# Patient Record
Sex: FEMALE | Race: WHITE | NOT HISPANIC OR LATINO | Employment: FULL TIME | URBAN - METROPOLITAN AREA
[De-identification: names, ages, dates, MRNs, and addresses within clinical notes are randomized per-mention and may not be internally consistent; named-entity substitution may affect disease eponyms.]

---

## 2021-10-09 ENCOUNTER — NURSE TRIAGE (OUTPATIENT)
Dept: ADMINISTRATIVE | Facility: CLINIC | Age: 21
End: 2021-10-09

## 2021-10-09 ENCOUNTER — HOSPITAL ENCOUNTER (EMERGENCY)
Facility: HOSPITAL | Age: 21
Discharge: HOME | End: 2021-10-09
Attending: FAMILY MEDICINE

## 2021-10-09 VITALS
OXYGEN SATURATION: 97 % | SYSTOLIC BLOOD PRESSURE: 108 MMHG | TEMPERATURE: 99 F | HEART RATE: 70 BPM | HEIGHT: 70 IN | DIASTOLIC BLOOD PRESSURE: 75 MMHG | WEIGHT: 230 LBS | RESPIRATION RATE: 19 BRPM | BODY MASS INDEX: 32.93 KG/M2

## 2021-10-09 DIAGNOSIS — K04.7 DENTAL INFECTION: ICD-10-CM

## 2021-10-09 DIAGNOSIS — K08.89 PAIN, DENTAL: Primary | ICD-10-CM

## 2021-10-09 PROCEDURE — 99284 EMERGENCY DEPT VISIT MOD MDM: CPT

## 2021-10-09 RX ORDER — HYDROCODONE BITARTRATE AND ACETAMINOPHEN 7.5; 325 MG/1; MG/1
1 TABLET ORAL EVERY 8 HOURS PRN
Qty: 6 TABLET | Refills: 0 | OUTPATIENT
Start: 2021-10-09 | End: 2023-01-17

## 2021-10-09 RX ORDER — AMOXICILLIN 875 MG/1
875 TABLET, FILM COATED ORAL 2 TIMES DAILY
Qty: 14 TABLET | Refills: 0 | OUTPATIENT
Start: 2021-10-09 | End: 2023-01-17

## 2023-01-17 ENCOUNTER — HOSPITAL ENCOUNTER (EMERGENCY)
Facility: HOSPITAL | Age: 23
Discharge: HOME | End: 2023-01-17
Attending: EMERGENCY MEDICINE

## 2023-01-17 VITALS
RESPIRATION RATE: 20 BRPM | WEIGHT: 245 LBS | HEART RATE: 88 BPM | BODY MASS INDEX: 35.07 KG/M2 | HEIGHT: 70 IN | OXYGEN SATURATION: 98 % | TEMPERATURE: 99 F | SYSTOLIC BLOOD PRESSURE: 125 MMHG | DIASTOLIC BLOOD PRESSURE: 85 MMHG

## 2023-01-17 DIAGNOSIS — R11.2 NAUSEA AND VOMITING, UNSPECIFIED VOMITING TYPE: Primary | ICD-10-CM

## 2023-01-17 LAB
HCV AB SERPL QL IA: NORMAL
HIV 1+2 AB+HIV1 P24 AG SERPL QL IA: NORMAL

## 2023-01-17 PROCEDURE — 86803 HEPATITIS C AB TEST: CPT | Performed by: EMERGENCY MEDICINE

## 2023-01-17 PROCEDURE — 87389 HIV-1 AG W/HIV-1&-2 AB AG IA: CPT | Performed by: EMERGENCY MEDICINE

## 2023-01-17 PROCEDURE — 99283 EMERGENCY DEPT VISIT LOW MDM: CPT

## 2023-01-17 PROCEDURE — 36415 COLL VENOUS BLD VENIPUNCTURE: CPT | Performed by: EMERGENCY MEDICINE

## 2023-01-17 NOTE — ED PROVIDER NOTES
Encounter Date: 1/17/2023       History     Chief Complaint   Patient presents with    General Illness     Abdominal cramping with n/v x 4 days. Reports symptoms have resolved and needs note for work.     22 year old patient presented to ER for work clearance letter after she got sick from food poisoning over the weekend, she declined nausea, vomiting, diarrhea and abdominal pain    The history is provided by the patient.   Review of patient's allergies indicates:  No Known Allergies  No past medical history on file.  No past surgical history on file.  No family history on file.  Social History     Tobacco Use    Smoking status: Never    Smokeless tobacco: Never   Substance Use Topics    Alcohol use: Yes     Comment: occ    Drug use: Never     Review of Systems   All other systems reviewed and are negative.    Physical Exam     Initial Vitals   BP Pulse Resp Temp SpO2   01/17/23 1052 01/17/23 1048 01/17/23 1048 01/17/23 1048 01/17/23 1048   125/85 88 20 98.7 °F (37.1 °C) 98 %      MAP       --                Physical Exam    Nursing note and vitals reviewed.  Constitutional: She appears well-developed and well-nourished. She is Obese . No distress.   HENT:   Head: Normocephalic and atraumatic.   Eyes: EOM are normal. Pupils are equal, round, and reactive to light.   Neck:   Normal range of motion.  Cardiovascular:  Normal rate and regular rhythm.           No murmur heard.  Pulmonary/Chest: Breath sounds normal. No respiratory distress. She has no wheezes. She has no rhonchi. She has no rales. She exhibits no tenderness.   Abdominal: Abdomen is soft.   Musculoskeletal:         General: Normal range of motion.      Cervical back: Normal range of motion.     Neurological: She is alert and oriented to person, place, and time. She has normal strength. GCS score is 15. GCS eye subscore is 4. GCS verbal subscore is 5. GCS motor subscore is 6.   Skin: Skin is warm and dry.   Psychiatric: She has a normal mood and affect.        ED Course   Procedures  Labs Reviewed   HIV 1 / 2 ANTIBODY   HEPATITIS C ANTIBODY          Imaging Results    None          Medications - No data to display  Medical Decision Making:   ED Management:  Benign physical exam  Ok to return to work .                        Clinical Impression:   Final diagnoses:  [R11.2] Nausea and vomiting, unspecified vomiting type (Primary)        ED Disposition Condition    Discharge Stable          ED Prescriptions    None       Follow-up Information       Follow up With Specialties Details Why Contact Info    [place] - Emergency Dept Emergency Medicine  If symptoms worsen [address] [facility] [address] [phone]             Rani Lopez NP  01/17/23 2945

## 2023-01-17 NOTE — Clinical Note
"Leticia Wangdusty Gurrola was seen and treated in our emergency department on 1/17/2023.  She may return to work on 01/18/2023.       If you have any questions or concerns, please don't hesitate to call.      Rani Lopez NP"

## 2023-02-12 ENCOUNTER — HOSPITAL ENCOUNTER (EMERGENCY)
Facility: HOSPITAL | Age: 23
Discharge: HOME | End: 2023-02-12
Attending: EMERGENCY MEDICINE

## 2023-02-12 VITALS
RESPIRATION RATE: 18 BRPM | DIASTOLIC BLOOD PRESSURE: 86 MMHG | SYSTOLIC BLOOD PRESSURE: 132 MMHG | OXYGEN SATURATION: 99 % | WEIGHT: 245 LBS | BODY MASS INDEX: 35.07 KG/M2 | TEMPERATURE: 98 F | HEART RATE: 98 BPM | HEIGHT: 70 IN

## 2023-02-12 DIAGNOSIS — K02.9 DENTAL CARIES: ICD-10-CM

## 2023-02-12 DIAGNOSIS — K08.89 TOOTHACHE: Primary | ICD-10-CM

## 2023-02-12 LAB
B-HCG UR QL: NEGATIVE
CTP QC/QA: YES

## 2023-02-12 PROCEDURE — 81025 URINE PREGNANCY TEST: CPT | Performed by: EMERGENCY MEDICINE

## 2023-02-12 PROCEDURE — 99284 EMERGENCY DEPT VISIT MOD MDM: CPT

## 2023-02-12 PROCEDURE — 63600175 PHARM REV CODE 636 W HCPCS: Performed by: EMERGENCY MEDICINE

## 2023-02-12 PROCEDURE — 96372 THER/PROPH/DIAG INJ SC/IM: CPT | Performed by: EMERGENCY MEDICINE

## 2023-02-12 RX ORDER — HYDROMORPHONE HYDROCHLORIDE 1 MG/ML
2 INJECTION, SOLUTION INTRAMUSCULAR; INTRAVENOUS; SUBCUTANEOUS
Status: COMPLETED | OUTPATIENT
Start: 2023-02-12 | End: 2023-02-12

## 2023-02-12 RX ORDER — NAPROXEN 500 MG/1
500 TABLET ORAL 2 TIMES DAILY WITH MEALS
Qty: 30 TABLET | Refills: 0 | Status: ON HOLD | OUTPATIENT
Start: 2023-02-12 | End: 2023-12-21 | Stop reason: HOSPADM

## 2023-02-12 RX ORDER — PENICILLIN V POTASSIUM 500 MG/1
500 TABLET, FILM COATED ORAL 4 TIMES DAILY
Qty: 40 TABLET | Refills: 0 | Status: SHIPPED | OUTPATIENT
Start: 2023-02-12 | End: 2023-02-19

## 2023-02-12 RX ORDER — OXYCODONE AND ACETAMINOPHEN 5; 325 MG/1; MG/1
1 TABLET ORAL EVERY 6 HOURS PRN
Qty: 8 TABLET | Refills: 0 | Status: ON HOLD | OUTPATIENT
Start: 2023-02-12 | End: 2023-12-18 | Stop reason: HOSPADM

## 2023-02-12 RX ADMIN — HYDROMORPHONE HYDROCHLORIDE 2 MG: 1 INJECTION, SOLUTION INTRAMUSCULAR; INTRAVENOUS; SUBCUTANEOUS at 04:02

## 2023-02-12 NOTE — DISCHARGE INSTRUCTIONS
Follow-up with dentist Monday morning as discussed.  Return to emergency department for worsening symptoms or any problems

## 2023-02-12 NOTE — ED PROVIDER NOTES
Encounter Date: 2/12/2023       History     Chief Complaint   Patient presents with    Dental Pain     23-year-old female presented emergency department with severe toothache ongoing for the past 2 days in the area of left lower wisdom tooth.  Patient denies any other complaints.  Denies fever or chills or nausea vomiting however has severe pain.    Review of patient's allergies indicates:  No Known Allergies  No past medical history on file.  No past surgical history on file.  No family history on file.  Social History     Tobacco Use    Smoking status: Never    Smokeless tobacco: Never   Substance Use Topics    Alcohol use: Yes     Comment: occ    Drug use: Never     Review of Systems   Constitutional: Negative.    HENT:  Positive for dental problem.    Eyes: Negative.    Respiratory: Negative.     Cardiovascular: Negative.  Negative for chest pain.   Gastrointestinal: Negative.    Endocrine: Negative.    Genitourinary: Negative.    Musculoskeletal: Negative.    Skin: Negative.    Allergic/Immunologic: Negative.    Neurological: Negative.    Hematological: Negative.    Psychiatric/Behavioral: Negative.     All other systems reviewed and are negative.    Physical Exam     Initial Vitals [02/12/23 0414]   BP Pulse Resp Temp SpO2   (!) 143/92 98 18 98.2 °F (36.8 °C) 97 %      MAP       --         Physical Exam    Nursing note and vitals reviewed.  Constitutional: She appears well-developed and well-nourished.   Uncomfortable   HENT:   Head: Normocephalic and atraumatic.   Nose: Nose normal.   Mouth/Throat: Oropharynx is clear and moist.   Bilateral lower molar teeth with dental caries.  No obvious gum swelling and no evidence of abscess and pain is secondary to dental caries and infection of the wisdom teeth more on the left lower than the right   Eyes: Conjunctivae and EOM are normal. Pupils are equal, round, and reactive to light.   Neck: Neck supple. No thyromegaly present. No tracheal deviation present. No JVD  present.   Normal range of motion.  Cardiovascular:  Normal rate, regular rhythm, normal heart sounds and intact distal pulses.           No murmur heard.  Pulmonary/Chest: Breath sounds normal. No stridor. No respiratory distress. She has no wheezes. She has no rales.   Abdominal: Abdomen is soft. Bowel sounds are normal.   Musculoskeletal:         General: No edema. Normal range of motion.      Cervical back: Normal range of motion and neck supple.     Neurological: She is alert and oriented to person, place, and time. GCS score is 15. GCS eye subscore is 4. GCS verbal subscore is 5. GCS motor subscore is 6.   Skin: Skin is warm. Capillary refill takes less than 2 seconds.   Psychiatric: She has a normal mood and affect. Thought content normal.       ED Course   Procedures  Labs Reviewed   POCT URINE PREGNANCY          Imaging Results    None          Medications   HYDROmorphone injection 2 mg (2 mg Intramuscular Given 2/12/23 2480)     Medical Decision Making:   Differential Diagnosis:   23-year-old with wisdom tooth dental caries.  Pain control.  Patient otherwise nontoxic.  Discharged with return precautions and instructions and follow-up                        Clinical Impression:   Final diagnoses:  [K08.89] Toothache (Primary)  [K02.9] Dental caries        ED Disposition Condition    Discharge Stable          ED Prescriptions       Medication Sig Dispense Start Date End Date Auth. Provider    penicillin v potassium (VEETID) 500 MG tablet Take 1 tablet (500 mg total) by mouth 4 (four) times daily. for 7 days 40 tablet 2/12/2023 2/19/2023 Giovanni Riojas MD    oxyCODONE-acetaminophen (PERCOCET) 5-325 mg per tablet Take 1 tablet by mouth every 6 (six) hours as needed for Pain. 8 tablet 2/12/2023 -- Giovanni Riojas MD    naproxen (NAPROSYN) 500 MG tablet Take 1 tablet (500 mg total) by mouth 2 (two) times daily with meals. 30 tablet 2/12/2023 -- Giovanni Riojas MD          Follow-up Information       Follow up With  Specialties Details Why Contact Info    [facility]  In 2 days  [address] [address]  [phone]               Giovanni Riojas MD  02/12/23 6748

## 2023-05-22 ENCOUNTER — HOSPITAL ENCOUNTER (EMERGENCY)
Facility: HOSPITAL | Age: 23
Discharge: HOME | End: 2023-05-22
Attending: EMERGENCY MEDICINE
Payer: OTHER MISCELLANEOUS

## 2023-05-22 VITALS
TEMPERATURE: 99 F | RESPIRATION RATE: 18 BRPM | WEIGHT: 250 LBS | SYSTOLIC BLOOD PRESSURE: 130 MMHG | OXYGEN SATURATION: 95 % | HEART RATE: 81 BPM | DIASTOLIC BLOOD PRESSURE: 94 MMHG | BODY MASS INDEX: 35.79 KG/M2 | HEIGHT: 70 IN

## 2023-05-22 DIAGNOSIS — S39.012A BACK STRAIN, INITIAL ENCOUNTER: Primary | ICD-10-CM

## 2023-05-22 LAB — B-HCG UR QL: NEGATIVE

## 2023-05-22 PROCEDURE — 99284 EMERGENCY DEPT VISIT MOD MDM: CPT

## 2023-05-22 PROCEDURE — 96372 THER/PROPH/DIAG INJ SC/IM: CPT | Performed by: PHYSICIAN ASSISTANT

## 2023-05-22 PROCEDURE — 63600175 PHARM REV CODE 636 W HCPCS: Performed by: PHYSICIAN ASSISTANT

## 2023-05-22 PROCEDURE — 81025 URINE PREGNANCY TEST: CPT | Performed by: PHYSICIAN ASSISTANT

## 2023-05-22 RX ORDER — ORPHENADRINE CITRATE 30 MG/ML
60 INJECTION INTRAMUSCULAR; INTRAVENOUS
Status: COMPLETED | OUTPATIENT
Start: 2023-05-22 | End: 2023-05-22

## 2023-05-22 RX ORDER — NAPROXEN 500 MG/1
500 TABLET ORAL 2 TIMES DAILY WITH MEALS
Qty: 14 TABLET | Refills: 0 | Status: SHIPPED | OUTPATIENT
Start: 2023-05-22 | End: 2023-05-29

## 2023-05-22 RX ORDER — KETOROLAC TROMETHAMINE 30 MG/ML
30 INJECTION, SOLUTION INTRAMUSCULAR; INTRAVENOUS
Status: COMPLETED | OUTPATIENT
Start: 2023-05-22 | End: 2023-05-22

## 2023-05-22 RX ORDER — CYCLOBENZAPRINE HCL 10 MG
10 TABLET ORAL 3 TIMES DAILY PRN
Qty: 21 TABLET | Refills: 0 | Status: SHIPPED | OUTPATIENT
Start: 2023-05-22 | End: 2023-05-29

## 2023-05-22 RX ADMIN — ORPHENADRINE CITRATE 60 MG: 30 INJECTION INTRAMUSCULAR; INTRAVENOUS at 08:05

## 2023-05-22 RX ADMIN — KETOROLAC TROMETHAMINE 30 MG: 30 INJECTION, SOLUTION INTRAMUSCULAR; INTRAVENOUS at 08:05

## 2023-05-22 NOTE — FIRST PROVIDER EVALUATION
Emergency Department TeleTriage Encounter Note      CHIEF COMPLAINT    Chief Complaint   Patient presents with    Back Pain     Lifting injury today        VITAL SIGNS   Initial Vitals [05/22/23 1759]   BP Pulse Resp Temp SpO2   (!) 137/93 103 18 98 °F (36.7 °C) 96 %      MAP       --            ALLERGIES    Review of patient's allergies indicates:  No Known Allergies    PROVIDER TRIAGE NOTE  This is a teletriage evaluation of a 23 y.o. female presenting to the ED complaining of back pain. 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 She reports low back pain. She denies numbness, tingling, or weakness in her legs. She denies incontinence. She took 12 ibuprofen today without relief.    Patient is alert and oriented. She speaks in complete sentences. She is sitting upright in the chair in no distress.     Initial orders will be placed and care will be transferred to an alternate provider when patient is roomed for a full evaluation. Any additional orders and the final disposition will be determined by that provider.         ORDERS  Labs Reviewed   PREGNANCY TEST, URINE RAPID       ED Orders (720h ago, onward)      Start Ordered     Status Ordering Provider    05/22/23 1804 05/22/23 1803  Pregnancy, urine rapid  STAT         Ordered KAYLA LYNNE              Virtual Visit Note: The provider triage portion of this emergency department evaluation and documentation was performed via [employer], a HIPAA-compliant telemedicine application, in concert with a tele-presenter in the room. A face to face patient evaluation with one of my colleagues will occur once the patient is placed in an emergency department room.      DISCLAIMER: This note was prepared with [employer] voice recognition transcription software. Garbled syntax, mangled pronouns, and other bizarre constructions may be attributed to that software system.

## 2023-05-22 NOTE — Clinical Note
"Leticia Wangen" Izabela was seen and treated in our emergency department on 5/22/2023.  She may return to work on 05/24/2023.       If you have any questions or concerns, please don't hesitate to call.      Jennifer Palomino PA-C"

## 2023-05-23 NOTE — ED PROVIDER NOTES
Encounter Date: 5/22/2023    SCRIBE #1 NOTE: I, Jair Nascimento, am scribing for, and in the presence of,  Jennifer Palomino PA-C.     History     Chief Complaint   Patient presents with    Back Pain     Lifting injury today      Time seen by provider: 7:01 PM on 05/22/2023    Leticia Gurrola is a 23 y.o. female who presents to the ED with an onset of persistent right-sided thoracic back pain that began this morning after a lifting injury. 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 She state her back pain worsens with movement. The patient denies numbness or tingling, weakness in her extremities or groin, urinary or bowel incontinence, or any other symptoms at this time. She has taken ibuprofen for the pain without relief. She denies a history of back issues. There is no recorded PMHx or PSHx.    The history is provided by the patient.   Review of patient's allergies indicates:  No Known Allergies  No past medical history on file.  No past surgical history on file.  No family history on file.  Social History     Tobacco Use    Smoking status: Never    Smokeless tobacco: Never   Substance Use Topics    Alcohol use: Yes     Comment: occ    Drug use: Never     Review of Systems   Constitutional:  Negative for activity change, appetite change, chills and fever.   HENT:  Negative for congestion, rhinorrhea and sore throat.    Eyes:  Negative for redness and visual disturbance.   Respiratory:  Negative for cough, chest tightness and shortness of breath.    Cardiovascular:  Negative for chest pain.   Gastrointestinal:  Negative for abdominal pain, diarrhea, nausea and vomiting.   Genitourinary:  Negative for dysuria and frequency.   Musculoskeletal:  Positive for back pain. Negative for neck pain and neck stiffness.   Skin:  Negative for rash.   Neurological:  Negative for dizziness, syncope, weakness and  numbness.        Negative for paresthesias or urinary or bowel incontinence.     Physical Exam     Initial Vitals [05/22/23 1759]   BP Pulse Resp Temp SpO2   (!) 137/93 103 18 98 °F (36.7 °C) 96 %      MAP       --         Physical Exam    Nursing note and vitals reviewed.  Constitutional: She appears well-developed and well-nourished. She is not diaphoretic. No distress.   HENT:   Head: Normocephalic and atraumatic.   Neck: Neck supple.   Normal range of motion.  Cardiovascular:  Normal rate, regular rhythm, normal heart sounds and intact distal pulses.           Pulmonary/Chest: Breath sounds normal. No respiratory distress. She has no wheezes. She has no rhonchi. She has no rales. She exhibits no tenderness.   Musculoskeletal:         General: Tenderness present. No edema. Normal range of motion.      Cervical back: Normal range of motion and neck supple. No bony tenderness. No spinous process tenderness.      Thoracic back: Tenderness (right-sided paraspinal thoracic) present. No bony tenderness.      Lumbar back: No bony tenderness.     Neurological: She is alert and oriented to person, place, and time. She has normal strength. No sensory deficit.   5/5 strength and sensation to the BUE's and BLE's.   Skin: Skin is warm and dry. No rash and no abscess noted. No erythema.   Psychiatric: She has a normal mood and affect.       ED Course   Procedures  Labs Reviewed   PREGNANCY TEST, URINE RAPID    Narrative:     Specimen Source->Urine          Imaging Results    None          Medications   ketorolac injection 30 mg (30 mg Intramuscular Given 5/22/23 2006)   orphenadrine injection 60 mg (60 mg Intramuscular Given 5/22/23 2007)     Medical Decision Making:   History:   Old Medical Records: I decided to obtain old medical records.  Clinical Tests:   Lab Tests: Ordered and Reviewed     APC / Resident Notes:   Urgent evaluation of a 23-year-old female who presents with right-sided back pain after lifting today.  She is  neurovascularly intact.  Pain is worse with movement.  She is muscular tenderness to palpation.  No midline spinal tenderness.  Patient was given medications in the ER with improvement in pain.  Recommend close follow up primary care.  Low suspicion for acute fracture. Discussed results with patient. Return precautions given. Based on my clinical evaluation, I do not appreciate any immediate, emergent, or life threatening condition or etiology that warrants additional workup today and feel that the patient can be discharged with close follow up care.  Patient is to follow up with their primary care provider. Case was discussed with Dr. Ku who is in agreement with the plan of care. All questions answered.      Scribe Attestation:   Scribe #1: I performed the above scribed service and the documentation accurately describes the services I performed. I attest to the accuracy of the note.            I, Jennifer Palomino PA-C, personally performed the services described in this documentation. All medical record entries made by the scribe were at my direction and in my presence.  I have reviewed the chart and agree that the record reflects my personal performance and is accurate and complete. Jennifer Palomino PA-C.  8:40 PM 05/22/2023         Clinical Impression:   Final diagnoses:  [S39.012A] Back strain, initial encounter (Primary)        ED Disposition Condition    Discharge Stable          ED Prescriptions       Medication Sig Dispense Start Date End Date Auth. Provider    naproxen (NAPROSYN) 500 MG tablet Take 1 tablet (500 mg total) by mouth 2 (two) times daily with meals. for 7 days 14 tablet 5/22/2023 5/29/2023 Jennifer Palomino PA-C    cyclobenzaprine (FLEXERIL) 10 MG tablet Take 1 tablet (10 mg total) by mouth 3 (three) times daily as needed for Muscle spasms. 21 tablet 5/22/2023 5/29/2023 Jennifer Palomino PA-C          Follow-up Information    None          Jennifer Jhaveri  CHOLO Palomino  05/22/23 2041

## 2023-05-23 NOTE — DISCHARGE INSTRUCTIONS
Take medications as prescribed.  Follow up with your primary care provider  For worsening symptoms, chest pain, shortness of breath, increased abdominal pain, high grade fever, stroke or stroke like symptoms, immediately go to the nearest Emergency Room or call 911 as soon as possible.

## 2023-06-07 ENCOUNTER — HOSPITAL ENCOUNTER (EMERGENCY)
Facility: HOSPITAL | Age: 23
Discharge: HOME | End: 2023-06-08
Attending: EMERGENCY MEDICINE

## 2023-06-07 DIAGNOSIS — K08.89 PAIN, DENTAL: Primary | ICD-10-CM

## 2023-06-07 PROCEDURE — 99281 EMR DPT VST MAYX REQ PHY/QHP: CPT

## 2023-06-08 VITALS
WEIGHT: 250 LBS | DIASTOLIC BLOOD PRESSURE: 92 MMHG | TEMPERATURE: 99 F | HEART RATE: 97 BPM | BODY MASS INDEX: 35.87 KG/M2 | OXYGEN SATURATION: 99 % | SYSTOLIC BLOOD PRESSURE: 138 MMHG | RESPIRATION RATE: 17 BRPM

## 2023-06-08 PROCEDURE — 25000003 PHARM REV CODE 250: Performed by: EMERGENCY MEDICINE

## 2023-06-08 PROCEDURE — 64400 NJX AA&/STRD TRIGEMINAL NRV: CPT | Mod: LT

## 2023-06-08 RX ORDER — BUPIVACAINE HYDROCHLORIDE 5 MG/ML
10 INJECTION, SOLUTION EPIDURAL; INTRACAUDAL
Status: COMPLETED | OUTPATIENT
Start: 2023-06-08 | End: 2023-06-08

## 2023-06-08 RX ADMIN — BUPIVACAINE HYDROCHLORIDE 50 MG: 5 INJECTION, SOLUTION EPIDURAL; INTRACAUDAL; PERINEURAL at 12:06

## 2023-06-08 NOTE — ED NOTES
Pt AAO and ambulatory.  VSS; no signs of distress.  Pt instructed regarding written discharge and follow-up instructions received upon completion of current visit; verbal acknowledgement of understanding.  Pt instructed regarding concerns/reasons for return visit relative to same issues surrounding current visit.

## 2023-06-08 NOTE — ED PROVIDER NOTES
Encounter Date: 6/7/2023       History     Chief Complaint   Patient presents with    Dental Pain     C/o left molar tooth pain since earlier this evening     23-year-old female with no past medical history presenting with left lower dental pain x2 days.  No swelling, fevers, throat pain, difficulty swallowing.  Patient took over-the-counter medications without relief prior to coming to the emergency department.  No other complaints.    Review of patient's allergies indicates:  No Known Allergies  No past medical history on file.  No past surgical history on file.  No family history on file.  Social History     Tobacco Use    Smoking status: Never    Smokeless tobacco: Never   Substance Use Topics    Alcohol use: Yes     Comment: occ    Drug use: Never     Review of Systems   Constitutional:  Negative for appetite change.   HENT:  Negative for facial swelling.    Eyes:  Negative for itching.   Respiratory:  Negative for apnea and stridor.    Gastrointestinal:  Negative for abdominal distention.   Genitourinary:  Negative for enuresis.   Musculoskeletal:  Negative for neck stiffness.   Skin:  Negative for color change.   Neurological:  Negative for tremors.   Hematological:  Does not bruise/bleed easily.   Psychiatric/Behavioral:  Negative for decreased concentration.      Physical Exam     Initial Vitals [06/07/23 2149]   BP Pulse Resp Temp SpO2   (!) 153/100 105 18 98.3 °F (36.8 °C) 98 %      MAP       --         Physical Exam    Constitutional: No distress.   HENT:   Head: Normocephalic.   Nose: Nose normal.   Positive dental fracture to left lower 3rd molar, no surrounding swelling, fluctuance or tenderness   Eyes: Right eye exhibits no discharge. Left eye exhibits no discharge.   Cardiovascular:  Normal rate.           Pulmonary/Chest: No stridor. No respiratory distress.   Abdominal: She exhibits no distension.     Neurological: She is alert.   Skin: Skin is warm and dry.   Psychiatric: She has a normal mood  "and affect.       ED Course   Nerve Block    Date/Time: 6/8/2023 4:35 AM  Location procedure was performed: [facility] EMERGENCY DEPARTMENT  Performed by: Santos Banks MD  Authorized by: Santos Banks MD   Pre-operative diagnosis: dental pain  Post-operative diagnosis: dentalpain  Consent Done: Yes  Consent: Verbal consent obtained.  Risks and benefits: risks, benefits and alternatives were discussed  Consent given by: patient  Patient understanding: patient states understanding of the procedure being performed  Patient identity confirmed: name and verbally with patient  Time out: Immediately prior to procedure a "time out" was called to verify the correct patient, procedure, equipment, support staff and site/side marked as required.  Indications: pain relief  Body area: face/mouth  Nerve: inferior alveolar  Laterality: left    Patient sedated: no  Location technique: anatomical landmarks  Local Anesthetic: bupivacaine 0.5% without epinephrine  Anesthetic total: 10 mL  Complications: No  Outcome: pain improved  Patient tolerance: Patient tolerated the procedure well with no immediate complications      Labs Reviewed - No data to display       Imaging Results    None          Medications   BUPivacaine (PF) 0.5% (5 mg/mL) injection 50 mg (50 mg Subcutaneous Given by Provider 6/8/23 0042)     Medical Decision Making:   Initial Assessment:   A/P:  No evidence of dental abscess, no indication for antibiotics or drainage at this time.  Patient with significant relief status post inferior alveolar dental block, will give instructions to follow-up within 24 hours with dental for further management.  Patient given strict immediate return precautions for worsening symptoms, discharged home.  No indication for admission or imaging.                        Clinical Impression:   Final diagnoses:  [K08.89] Pain, dental (Primary)        ED Disposition Condition    Discharge Stable          ED Prescriptions    None       Follow-up " Information       Follow up With Specialties Details Why Contact Info    [facility]  Schedule an appointment as soon as possible for a visit                Santos Banks MD  06/08/23 8358

## 2023-12-16 ENCOUNTER — HOSPITAL ENCOUNTER (INPATIENT)
Facility: HOSPITAL | Age: 23
LOS: 2 days | Discharge: PSYCHIATRIC OR SUBSTANCE USE FACILITY | DRG: 918 | End: 2023-12-18
Attending: EMERGENCY MEDICINE | Admitting: HOSPITALIST

## 2023-12-16 DIAGNOSIS — I45.9 HEART BLOCK: ICD-10-CM

## 2023-12-16 DIAGNOSIS — T39.1X2A INTENTIONAL ACETAMINOPHEN OVERDOSE, INITIAL ENCOUNTER: Primary | ICD-10-CM

## 2023-12-16 DIAGNOSIS — Z00.8 MEDICAL CLEARANCE FOR PSYCHIATRIC ADMISSION: ICD-10-CM

## 2023-12-16 LAB
ALBUMIN SERPL BCP-MCNC: 4.3 G/DL (ref 3.5–5.2)
ALP SERPL-CCNC: 61 U/L (ref 55–135)
ALT SERPL W/O P-5'-P-CCNC: 24 U/L (ref 10–44)
ANION GAP SERPL CALC-SCNC: 17 MMOL/L (ref 8–16)
APAP SERPL-MCNC: 156.9 UG/ML (ref 10–20)
APAP SERPL-MCNC: 93.3 UG/ML (ref 10–20)
AST SERPL-CCNC: 17 U/L (ref 10–40)
B-HCG UR QL: NEGATIVE
BASOPHILS # BLD AUTO: 0.06 K/UL (ref 0–0.2)
BASOPHILS NFR BLD: 0.7 % (ref 0–1.9)
BILIRUB SERPL-MCNC: 0.3 MG/DL (ref 0.1–1)
BUN SERPL-MCNC: 9 MG/DL (ref 6–20)
CALCIUM SERPL-MCNC: 9.3 MG/DL (ref 8.7–10.5)
CHLORIDE SERPL-SCNC: 105 MMOL/L (ref 95–110)
CO2 SERPL-SCNC: 16 MMOL/L (ref 23–29)
CREAT SERPL-MCNC: 1 MG/DL (ref 0.5–1.4)
CTP QC/QA: YES
DIFFERENTIAL METHOD: NORMAL
EOSINOPHIL # BLD AUTO: 0.1 K/UL (ref 0–0.5)
EOSINOPHIL NFR BLD: 0.8 % (ref 0–8)
ERYTHROCYTE [DISTWIDTH] IN BLOOD BY AUTOMATED COUNT: 12.3 % (ref 11.5–14.5)
EST. GFR  (NO RACE VARIABLE): >60 ML/MIN/1.73 M^2
ETHANOL SERPL-MCNC: <10 MG/DL
GLUCOSE SERPL-MCNC: 112 MG/DL (ref 70–110)
HCT VFR BLD AUTO: 42.9 % (ref 37–48.5)
HGB BLD-MCNC: 14.6 G/DL (ref 12–16)
IMM GRANULOCYTES # BLD AUTO: 0.02 K/UL (ref 0–0.04)
IMM GRANULOCYTES NFR BLD AUTO: 0.2 % (ref 0–0.5)
LYMPHOCYTES # BLD AUTO: 3 K/UL (ref 1–4.8)
LYMPHOCYTES NFR BLD: 35 % (ref 18–48)
MCH RBC QN AUTO: 29.4 PG (ref 27–31)
MCHC RBC AUTO-ENTMCNC: 34 G/DL (ref 32–36)
MCV RBC AUTO: 86 FL (ref 82–98)
MONOCYTES # BLD AUTO: 0.6 K/UL (ref 0.3–1)
MONOCYTES NFR BLD: 7.6 % (ref 4–15)
NEUTROPHILS # BLD AUTO: 4.7 K/UL (ref 1.8–7.7)
NEUTROPHILS NFR BLD: 55.7 % (ref 38–73)
NRBC BLD-RTO: 0 /100 WBC
PLATELET # BLD AUTO: 301 K/UL (ref 150–450)
PMV BLD AUTO: 9.5 FL (ref 9.2–12.9)
POTASSIUM SERPL-SCNC: 3.8 MMOL/L (ref 3.5–5.1)
PROT SERPL-MCNC: 7.5 G/DL (ref 6–8.4)
RBC # BLD AUTO: 4.97 M/UL (ref 4–5.4)
SALICYLATES SERPL-MCNC: <1.5 MG/DL (ref 15–30)
SODIUM SERPL-SCNC: 138 MMOL/L (ref 136–145)
TSH SERPL DL<=0.005 MIU/L-ACNC: 2.91 UIU/ML (ref 0.34–5.6)
WBC # BLD AUTO: 8.45 K/UL (ref 3.9–12.7)

## 2023-12-16 PROCEDURE — 63600175 PHARM REV CODE 636 W HCPCS

## 2023-12-16 PROCEDURE — 81003 URINALYSIS AUTO W/O SCOPE: CPT | Performed by: EMERGENCY MEDICINE

## 2023-12-16 PROCEDURE — 82077 ASSAY SPEC XCP UR&BREATH IA: CPT | Performed by: EMERGENCY MEDICINE

## 2023-12-16 PROCEDURE — 81025 URINE PREGNANCY TEST: CPT | Performed by: EMERGENCY MEDICINE

## 2023-12-16 PROCEDURE — 85025 COMPLETE CBC W/AUTO DIFF WBC: CPT | Performed by: EMERGENCY MEDICINE

## 2023-12-16 PROCEDURE — 96372 THER/PROPH/DIAG INJ SC/IM: CPT

## 2023-12-16 PROCEDURE — 80053 COMPREHEN METABOLIC PANEL: CPT | Performed by: EMERGENCY MEDICINE

## 2023-12-16 PROCEDURE — 93010 EKG 12-LEAD: ICD-10-PCS | Mod: ,,, | Performed by: GENERAL PRACTICE

## 2023-12-16 PROCEDURE — 80307 DRUG TEST PRSMV CHEM ANLYZR: CPT | Performed by: EMERGENCY MEDICINE

## 2023-12-16 PROCEDURE — 84443 ASSAY THYROID STIM HORMONE: CPT | Performed by: EMERGENCY MEDICINE

## 2023-12-16 PROCEDURE — 80143 DRUG ASSAY ACETAMINOPHEN: CPT | Performed by: EMERGENCY MEDICINE

## 2023-12-16 PROCEDURE — 93010 ELECTROCARDIOGRAM REPORT: CPT | Mod: ,,, | Performed by: GENERAL PRACTICE

## 2023-12-16 PROCEDURE — 12000002 HC ACUTE/MED SURGE SEMI-PRIVATE ROOM

## 2023-12-16 PROCEDURE — 99291 CRITICAL CARE FIRST HOUR: CPT

## 2023-12-16 PROCEDURE — 80179 DRUG ASSAY SALICYLATE: CPT | Performed by: EMERGENCY MEDICINE

## 2023-12-16 PROCEDURE — 93005 ELECTROCARDIOGRAM TRACING: CPT | Performed by: GENERAL PRACTICE

## 2023-12-16 RX ORDER — ONDANSETRON 2 MG/ML
4 INJECTION INTRAMUSCULAR; INTRAVENOUS
Status: COMPLETED | OUTPATIENT
Start: 2023-12-17 | End: 2023-12-16

## 2023-12-16 RX ORDER — DIPHENHYDRAMINE HYDROCHLORIDE 50 MG/ML
INJECTION INTRAMUSCULAR; INTRAVENOUS
Status: COMPLETED
Start: 2023-12-16 | End: 2023-12-16

## 2023-12-16 RX ORDER — LORAZEPAM 2 MG/ML
INJECTION INTRAMUSCULAR
Status: COMPLETED
Start: 2023-12-16 | End: 2023-12-16

## 2023-12-16 RX ORDER — HALOPERIDOL 5 MG/ML
INJECTION INTRAMUSCULAR
Status: COMPLETED
Start: 2023-12-16 | End: 2023-12-16

## 2023-12-16 RX ORDER — DROPERIDOL 2.5 MG/ML
5 INJECTION, SOLUTION INTRAMUSCULAR; INTRAVENOUS ONCE
Status: DISCONTINUED | OUTPATIENT
Start: 2023-12-16 | End: 2023-12-16

## 2023-12-16 RX ORDER — HALOPERIDOL 5 MG/ML
5 INJECTION INTRAMUSCULAR ONCE
Status: COMPLETED | OUTPATIENT
Start: 2023-12-16 | End: 2023-12-16

## 2023-12-16 RX ORDER — ONDANSETRON 2 MG/ML
INJECTION INTRAMUSCULAR; INTRAVENOUS
Status: COMPLETED
Start: 2023-12-16 | End: 2023-12-16

## 2023-12-16 RX ORDER — LORAZEPAM 2 MG/ML
2 INJECTION INTRAMUSCULAR
Status: COMPLETED | OUTPATIENT
Start: 2023-12-16 | End: 2023-12-16

## 2023-12-16 RX ORDER — DIPHENHYDRAMINE HYDROCHLORIDE 50 MG/ML
25 INJECTION INTRAMUSCULAR; INTRAVENOUS
Status: COMPLETED | OUTPATIENT
Start: 2023-12-16 | End: 2023-12-16

## 2023-12-16 RX ADMIN — HALOPERIDOL LACTATE 5 MG: 5 INJECTION, SOLUTION INTRAMUSCULAR at 07:12

## 2023-12-16 RX ADMIN — DIPHENHYDRAMINE HYDROCHLORIDE 25 MG: 50 INJECTION INTRAMUSCULAR; INTRAVENOUS at 07:12

## 2023-12-16 RX ADMIN — LORAZEPAM 2 MG: 2 INJECTION INTRAMUSCULAR; INTRAVENOUS at 07:12

## 2023-12-16 RX ADMIN — LORAZEPAM 2 MG: 2 INJECTION INTRAMUSCULAR at 07:12

## 2023-12-16 RX ADMIN — HALOPERIDOL 5 MG: 5 INJECTION INTRAMUSCULAR at 07:12

## 2023-12-16 RX ADMIN — ONDANSETRON 4 MG: 2 INJECTION INTRAMUSCULAR; INTRAVENOUS at 11:12

## 2023-12-17 ENCOUNTER — CLINICAL SUPPORT (OUTPATIENT)
Dept: CARDIOLOGY | Facility: HOSPITAL | Age: 23
DRG: 918 | End: 2023-12-17
Attending: INTERNAL MEDICINE

## 2023-12-17 VITALS — WEIGHT: 249.13 LBS | HEIGHT: 70 IN | BODY MASS INDEX: 35.66 KG/M2

## 2023-12-17 DIAGNOSIS — I44.1 WENCKEBACH PAUSE: Primary | ICD-10-CM

## 2023-12-17 DIAGNOSIS — I44.1 MOBITZ (TYPE) I (WENCKEBACH'S) ATRIOVENTRICULAR BLOCK: ICD-10-CM

## 2023-12-17 PROBLEM — T14.91XA SUICIDE ATTEMPT: Status: ACTIVE | Noted: 2023-12-17

## 2023-12-17 LAB
ALBUMIN SERPL BCP-MCNC: 4.2 G/DL (ref 3.5–5.2)
ALBUMIN SERPL BCP-MCNC: 4.5 G/DL (ref 3.5–5.2)
ALP SERPL-CCNC: 55 U/L (ref 55–135)
ALP SERPL-CCNC: 58 U/L (ref 55–135)
ALT SERPL W/O P-5'-P-CCNC: 20 U/L (ref 10–44)
ALT SERPL W/O P-5'-P-CCNC: 26 U/L (ref 10–44)
AMPHET+METHAMPHET UR QL: NEGATIVE
ANION GAP SERPL CALC-SCNC: 13 MMOL/L (ref 8–16)
AORTIC ROOT ANNULUS: 3.1 CM
AORTIC VALVE CUSP SEPERATION: 2 CM
APAP SERPL-MCNC: 13.7 UG/ML (ref 10–20)
APAP SERPL-MCNC: 139.3 UG/ML (ref 10–20)
ASCENDING AORTA: 2.7 CM
AST SERPL-CCNC: 12 U/L (ref 10–40)
AST SERPL-CCNC: 16 U/L (ref 10–40)
AV PEAK GRADIENT: 5 MMHG
AV VALVE AREA BY VELOCITY RATIO: 2.95 CM²
AV VELOCITY RATIO: 0.85
BARBITURATES UR QL SCN>200 NG/ML: NEGATIVE
BASOPHILS # BLD AUTO: 0.04 K/UL (ref 0–0.2)
BASOPHILS NFR BLD: 0.7 % (ref 0–1.9)
BENZODIAZ UR QL SCN>200 NG/ML: NEGATIVE
BILIRUB DIRECT SERPL-MCNC: 0 MG/DL (ref 0.1–0.3)
BILIRUB SERPL-MCNC: 0.3 MG/DL (ref 0.1–1)
BILIRUB SERPL-MCNC: 0.3 MG/DL (ref 0.1–1)
BILIRUB UR QL STRIP: NEGATIVE
BSA FOR ECHO PROCEDURE: 2.36 M2
BUN SERPL-MCNC: 9 MG/DL (ref 6–20)
BZE UR QL SCN: NEGATIVE
CALCIUM SERPL-MCNC: 9.3 MG/DL (ref 8.7–10.5)
CANNABINOIDS UR QL SCN: NEGATIVE
CHLORIDE SERPL-SCNC: 107 MMOL/L (ref 95–110)
CLARITY UR: CLEAR
CO2 SERPL-SCNC: 19 MMOL/L (ref 23–29)
COLOR UR: YELLOW
CREAT SERPL-MCNC: 0.9 MG/DL (ref 0.5–1.4)
CREAT UR-MCNC: 71.4 MG/DL (ref 15–325)
CV ECHO LV RWT: 0.46 CM
DIFFERENTIAL METHOD: NORMAL
DOP CALC AO PEAK VEL: 1.15 M/S
DOP CALC LVOT AREA: 3.5 CM2
DOP CALC LVOT DIAMETER: 2.1 CM
DOP CALC LVOT PEAK VEL: 0.98 M/S
DOP CALC MV VTI: 19.5 CM
E WAVE DECELERATION TIME: 201 MSEC
E/A RATIO: 1.35
ECHO LV POSTERIOR WALL: 0.95 CM (ref 0.6–1.1)
EOSINOPHIL # BLD AUTO: 0 K/UL (ref 0–0.5)
EOSINOPHIL NFR BLD: 0.2 % (ref 0–8)
ERYTHROCYTE [DISTWIDTH] IN BLOOD BY AUTOMATED COUNT: 12.4 % (ref 11.5–14.5)
EST. GFR  (NO RACE VARIABLE): >60 ML/MIN/1.73 M^2
FRACTIONAL SHORTENING: 39 % (ref 28–44)
GLUCOSE SERPL-MCNC: 132 MG/DL (ref 70–110)
GLUCOSE UR QL STRIP: NEGATIVE
HCT VFR BLD AUTO: 44.4 % (ref 37–48.5)
HGB BLD-MCNC: 15.1 G/DL (ref 12–16)
HGB UR QL STRIP: NEGATIVE
IMM GRANULOCYTES # BLD AUTO: 0.02 K/UL (ref 0–0.04)
IMM GRANULOCYTES NFR BLD AUTO: 0.4 % (ref 0–0.5)
INR PPP: 1 (ref 0.8–1.2)
INTERVENTRICULAR SEPTUM: 0.97 CM (ref 0.6–1.1)
KETONES UR QL STRIP: ABNORMAL
LEFT ATRIUM SIZE: 2 CM
LEFT INTERNAL DIMENSION IN SYSTOLE: 2.53 CM (ref 2.1–4)
LEFT VENTRICLE DIASTOLIC VOLUME INDEX: 32.97 ML/M2
LEFT VENTRICLE DIASTOLIC VOLUME: 75.5 ML
LEFT VENTRICLE MASS INDEX: 55 G/M2
LEFT VENTRICLE SYSTOLIC VOLUME INDEX: 10 ML/M2
LEFT VENTRICLE SYSTOLIC VOLUME: 23 ML
LEFT VENTRICULAR INTERNAL DIMENSION IN DIASTOLE: 4.13 CM (ref 3.5–6)
LEFT VENTRICULAR MASS: 126.23 G
LEUKOCYTE ESTERASE UR QL STRIP: NEGATIVE
LYMPHOCYTES # BLD AUTO: 1.8 K/UL (ref 1–4.8)
LYMPHOCYTES NFR BLD: 32.9 % (ref 18–48)
MAGNESIUM SERPL-MCNC: 2.1 MG/DL (ref 1.6–2.6)
MCH RBC QN AUTO: 29.2 PG (ref 27–31)
MCHC RBC AUTO-ENTMCNC: 34 G/DL (ref 32–36)
MCV RBC AUTO: 86 FL (ref 82–98)
MONOCYTES # BLD AUTO: 0.5 K/UL (ref 0.3–1)
MONOCYTES NFR BLD: 8.7 % (ref 4–15)
MV MEAN GRADIENT: 2 MMHG
MV PEAK A VEL: 0.57 M/S
MV PEAK E VEL: 0.77 M/S
MV PEAK GRADIENT: 3 MMHG
MV STENOSIS PRESSURE HALF TIME: 107 MS
MV VALVE AREA P 1/2 METHOD: 2.06 CM2
NEUTROPHILS # BLD AUTO: 3.1 K/UL (ref 1.8–7.7)
NEUTROPHILS NFR BLD: 57.1 % (ref 38–73)
NITRITE UR QL STRIP: NEGATIVE
NRBC BLD-RTO: 0 /100 WBC
OPIATES UR QL SCN: NEGATIVE
PCP UR QL SCN>25 NG/ML: NEGATIVE
PH UR STRIP: 6 [PH] (ref 5–8)
PISA TR MAX VEL: 2.39 M/S
PLATELET # BLD AUTO: 305 K/UL (ref 150–450)
PMV BLD AUTO: 9.2 FL (ref 9.2–12.9)
POTASSIUM SERPL-SCNC: 4.2 MMOL/L (ref 3.5–5.1)
PROT SERPL-MCNC: 7.3 G/DL (ref 6–8.4)
PROT SERPL-MCNC: 7.8 G/DL (ref 6–8.4)
PROT UR QL STRIP: NEGATIVE
PROTHROMBIN TIME: 11.6 SEC (ref 9–12.5)
PV MV: 0.67 M/S
PV PEAK GRADIENT: 4 MMHG
PV PEAK VELOCITY: 0.98 M/S
RBC # BLD AUTO: 5.18 M/UL (ref 4–5.4)
RIGHT VENTRICULAR END-DIASTOLIC DIMENSION: 2.18 CM
SODIUM SERPL-SCNC: 139 MMOL/L (ref 136–145)
SP GR UR STRIP: 1.02 (ref 1–1.03)
TOXICOLOGY INFORMATION: NORMAL
TR MAX PG: 23 MMHG
TROPONIN I SERPL HS-MCNC: <2.3 PG/ML (ref 0–14.9)
URN SPEC COLLECT METH UR: ABNORMAL
UROBILINOGEN UR STRIP-ACNC: NEGATIVE EU/DL
WBC # BLD AUTO: 5.41 K/UL (ref 3.9–12.7)
Z-SCORE OF LEFT VENTRICULAR DIMENSION IN END DIASTOLE: -7.51
Z-SCORE OF LEFT VENTRICULAR DIMENSION IN END SYSTOLE: -5.82

## 2023-12-17 PROCEDURE — 25000003 PHARM REV CODE 250: Performed by: EMERGENCY MEDICINE

## 2023-12-17 PROCEDURE — 36415 COLL VENOUS BLD VENIPUNCTURE: CPT | Performed by: HOSPITALIST

## 2023-12-17 PROCEDURE — 93306 TTE W/DOPPLER COMPLETE: CPT | Mod: 26,,, | Performed by: STUDENT IN AN ORGANIZED HEALTH CARE EDUCATION/TRAINING PROGRAM

## 2023-12-17 PROCEDURE — 80143 DRUG ASSAY ACETAMINOPHEN: CPT | Mod: 91 | Performed by: HOSPITALIST

## 2023-12-17 PROCEDURE — 93010 ELECTROCARDIOGRAM REPORT: CPT | Mod: ,,, | Performed by: GENERAL PRACTICE

## 2023-12-17 PROCEDURE — 93005 ELECTROCARDIOGRAM TRACING: CPT | Performed by: GENERAL PRACTICE

## 2023-12-17 PROCEDURE — 83735 ASSAY OF MAGNESIUM: CPT | Performed by: INTERNAL MEDICINE

## 2023-12-17 PROCEDURE — 63600175 PHARM REV CODE 636 W HCPCS: Performed by: EMERGENCY MEDICINE

## 2023-12-17 PROCEDURE — 94761 N-INVAS EAR/PLS OXIMETRY MLT: CPT

## 2023-12-17 PROCEDURE — 99900035 HC TECH TIME PER 15 MIN (STAT)

## 2023-12-17 PROCEDURE — 80053 COMPREHEN METABOLIC PANEL: CPT | Performed by: HOSPITALIST

## 2023-12-17 PROCEDURE — 93306 TTE W/DOPPLER COMPLETE: CPT

## 2023-12-17 PROCEDURE — 63600175 PHARM REV CODE 636 W HCPCS: Performed by: HOSPITALIST

## 2023-12-17 PROCEDURE — 80143 DRUG ASSAY ACETAMINOPHEN: CPT | Performed by: HOSPITALIST

## 2023-12-17 PROCEDURE — 85610 PROTHROMBIN TIME: CPT | Performed by: HOSPITALIST

## 2023-12-17 PROCEDURE — 85025 COMPLETE CBC W/AUTO DIFF WBC: CPT | Performed by: HOSPITALIST

## 2023-12-17 PROCEDURE — 21000000 HC CCU ICU ROOM CHARGE

## 2023-12-17 PROCEDURE — 80076 HEPATIC FUNCTION PANEL: CPT | Performed by: HOSPITALIST

## 2023-12-17 PROCEDURE — 84484 ASSAY OF TROPONIN QUANT: CPT | Performed by: INTERNAL MEDICINE

## 2023-12-17 PROCEDURE — 99223 PR INITIAL HOSPITAL CARE,LEVL III: ICD-10-PCS | Mod: 25,,, | Performed by: STUDENT IN AN ORGANIZED HEALTH CARE EDUCATION/TRAINING PROGRAM

## 2023-12-17 PROCEDURE — 93010 EKG 12-LEAD: ICD-10-PCS | Mod: ,,, | Performed by: GENERAL PRACTICE

## 2023-12-17 PROCEDURE — 99223 1ST HOSP IP/OBS HIGH 75: CPT | Mod: 25,,, | Performed by: STUDENT IN AN ORGANIZED HEALTH CARE EDUCATION/TRAINING PROGRAM

## 2023-12-17 PROCEDURE — 99900031 HC PATIENT EDUCATION (STAT)

## 2023-12-17 PROCEDURE — 25000003 PHARM REV CODE 250: Performed by: HOSPITALIST

## 2023-12-17 PROCEDURE — 93306 ECHO (CUPID ONLY): ICD-10-PCS | Mod: 26,,, | Performed by: STUDENT IN AN ORGANIZED HEALTH CARE EDUCATION/TRAINING PROGRAM

## 2023-12-17 RX ORDER — CALCIUM GLUCONATE 20 MG/ML
1 INJECTION, SOLUTION INTRAVENOUS
Status: DISCONTINUED | OUTPATIENT
Start: 2023-12-17 | End: 2023-12-18 | Stop reason: HOSPADM

## 2023-12-17 RX ORDER — SODIUM CHLORIDE 0.9 % (FLUSH) 0.9 %
10 SYRINGE (ML) INJECTION
Status: DISCONTINUED | OUTPATIENT
Start: 2023-12-17 | End: 2023-12-18 | Stop reason: HOSPADM

## 2023-12-17 RX ORDER — ONDANSETRON 2 MG/ML
4 INJECTION INTRAMUSCULAR; INTRAVENOUS EVERY 8 HOURS PRN
Status: DISCONTINUED | OUTPATIENT
Start: 2023-12-17 | End: 2023-12-18 | Stop reason: HOSPADM

## 2023-12-17 RX ORDER — CALCIUM GLUCONATE 20 MG/ML
3 INJECTION, SOLUTION INTRAVENOUS
Status: DISCONTINUED | OUTPATIENT
Start: 2023-12-17 | End: 2023-12-18 | Stop reason: HOSPADM

## 2023-12-17 RX ORDER — HALOPERIDOL 5 MG/ML
5 INJECTION INTRAMUSCULAR EVERY 6 HOURS PRN
Status: DISCONTINUED | OUTPATIENT
Start: 2023-12-17 | End: 2023-12-17

## 2023-12-17 RX ORDER — MAGNESIUM SULFATE HEPTAHYDRATE 40 MG/ML
2 INJECTION, SOLUTION INTRAVENOUS
Status: DISCONTINUED | OUTPATIENT
Start: 2023-12-17 | End: 2023-12-18 | Stop reason: HOSPADM

## 2023-12-17 RX ORDER — MAGNESIUM SULFATE HEPTAHYDRATE 40 MG/ML
4 INJECTION, SOLUTION INTRAVENOUS
Status: DISCONTINUED | OUTPATIENT
Start: 2023-12-17 | End: 2023-12-18 | Stop reason: HOSPADM

## 2023-12-17 RX ORDER — CALCIUM GLUCONATE 20 MG/ML
2 INJECTION, SOLUTION INTRAVENOUS
Status: DISCONTINUED | OUTPATIENT
Start: 2023-12-17 | End: 2023-12-18 | Stop reason: HOSPADM

## 2023-12-17 RX ORDER — POTASSIUM CHLORIDE 7.45 MG/ML
80 INJECTION INTRAVENOUS
Status: DISCONTINUED | OUTPATIENT
Start: 2023-12-17 | End: 2023-12-18 | Stop reason: HOSPADM

## 2023-12-17 RX ORDER — POTASSIUM CHLORIDE 7.45 MG/ML
60 INJECTION INTRAVENOUS
Status: DISCONTINUED | OUTPATIENT
Start: 2023-12-17 | End: 2023-12-18 | Stop reason: HOSPADM

## 2023-12-17 RX ORDER — TALC
6 POWDER (GRAM) TOPICAL NIGHTLY PRN
Status: DISCONTINUED | OUTPATIENT
Start: 2023-12-17 | End: 2023-12-18 | Stop reason: HOSPADM

## 2023-12-17 RX ORDER — POTASSIUM CHLORIDE 7.45 MG/ML
40 INJECTION INTRAVENOUS
Status: DISCONTINUED | OUTPATIENT
Start: 2023-12-17 | End: 2023-12-18 | Stop reason: HOSPADM

## 2023-12-17 RX ADMIN — ACETYLCYSTEINE 5000 MG: 200 INJECTION, SOLUTION INTRAVENOUS at 02:12

## 2023-12-17 RX ADMIN — ACETYLCYSTEINE 10000 MG: 200 INJECTION, SOLUTION INTRAVENOUS at 06:12

## 2023-12-17 RX ADMIN — ACETYLCYSTEINE 15000 MG: 200 INJECTION, SOLUTION INTRAVENOUS at 12:12

## 2023-12-17 RX ADMIN — ONDANSETRON 4 MG: 2 INJECTION INTRAMUSCULAR; INTRAVENOUS at 02:12

## 2023-12-17 NOTE — NURSING
Nurses Note -- 4 Eyes      12/17/2023   1:29 AM      Skin assessed during: Admit      [] No Altered Skin Integrity Present    []Prevention Measures Documented      [x] Yes- Altered Skin Integrity Present or Discovered   [x] LDA Added if Not in Epic (Describe Wound)   [x] New Altered Skin Integrity was Present on Admit and Documented in LDA   [x] Wound Image Taken    Wound Care Consulted? yes    Attending Nurse:  delia Newton RN/Staff Member:   [id number]

## 2023-12-17 NOTE — NURSING
Notified Onofre MOREJON of 6 second heart pause, stat labs ordered. I called lab, the tech is on the way to draw stat labs.

## 2023-12-17 NOTE — ED NOTES
Patient refuses to allow us to draw any labs or provide any medical treatment to her at this time. Patient states that she doesn't want my help/ She wants to die and that I should go help someone that wants it.

## 2023-12-17 NOTE — ASSESSMENT & PLAN NOTE
With acetaminophen overdose.  Started on NAC protocol.  Continue 21 hour protocol.  Monitor Tylenol level, CMP, INR.  Patient PEC'd.  Consult Psychiatry.  Continue Haldol if needed for agitation

## 2023-12-17 NOTE — NURSING
Notified Dr. Adhikari of patient HR june to 34 bpm while vomiting. MT sending tele strips to review and stated rhythm looks like a 3rd degree heart block.

## 2023-12-17 NOTE — ED NOTES
Patient states that she took (30) thirty 800 mg Ibuprofen and approximately (50) fifty 500 mg Tylenol. Patient also has multiple lacerations to left arm from cutting herself. Patient states that she does not want to live anymore.

## 2023-12-17 NOTE — ED PROVIDER NOTES
Encounter Date: 12/16/2023       History     Chief Complaint   Patient presents with    Mental Health Problem     Pt arrived by ems with suicidal ideations     Drug Overdose     Pt states she attempted suicide by taking 30 800mg ibuprofen and 1/2 a bottle of tylenol 500mg. Pt also has superficial lacerations to her left wrist and left thigh. Pt states abdomen pain     23-year-old female presents emergency department with suicide attempts/overdose.  Patient reported that she took 3800 mg ibuprofen and maybe 5500 mg Tylenol at a proximally 430 4:45 p.m..  She says that this was a suicide attempt.  She says that her life isn't worth living.  She is depressed.  She says that she did not take anything else.  No alcohol no illicit drugs.      Review of patient's allergies indicates:  No Known Allergies  No past medical history on file.  No past surgical history on file.  No family history on file.  Social History     Tobacco Use    Smoking status: Never    Smokeless tobacco: Never   Substance Use Topics    Alcohol use: Yes     Comment: occ    Drug use: Never     Review of Systems   Unable to perform ROS: Psychiatric disorder       Physical Exam     Initial Vitals [12/16/23 1849]   BP Pulse Resp Temp SpO2   (!) 149/96 (!) 112 18 97.8 °F (36.6 °C) 100 %      MAP       --         Physical Exam    Nursing note and vitals reviewed.  Constitutional: She appears well-developed and well-nourished.   HENT:   Head: Normocephalic and atraumatic.   Right Ear: External ear normal.   Left Ear: External ear normal.   Mouth/Throat: No oropharyngeal exudate.   Eyes: Conjunctivae and EOM are normal. Pupils are equal, round, and reactive to light.   Neck: Neck supple. No tracheal deviation present.   Cardiovascular:  Normal rate, regular rhythm, normal heart sounds and intact distal pulses.           No murmur heard.  Pulmonary/Chest: Breath sounds normal. No stridor. No respiratory distress. She has no wheezes. She has no rhonchi. She has  no rales.   Abdominal: Abdomen is soft. She exhibits no distension. There is no abdominal tenderness. There is no rebound and no guarding.   Musculoskeletal:         General: No tenderness or edema. Normal range of motion.      Cervical back: Neck supple.     Lymphadenopathy:     She has no cervical adenopathy.   Neurological: She is alert and oriented to person, place, and time. She has normal strength. No cranial nerve deficit or sensory deficit.   Skin: Skin is warm and dry. Capillary refill takes less than 2 seconds. No rash noted. No erythema. No pallor.   Psychiatric: Her behavior is normal. Judgment normal. She exhibits a depressed mood. She expresses suicidal ideation. She expresses no homicidal ideation. She expresses suicidal plans. She expresses no homicidal plans.   Poor eye contact         ED Course   Procedures  Labs Reviewed   COMPREHENSIVE METABOLIC PANEL - Abnormal; Notable for the following components:       Result Value    CO2 16 (*)     Glucose 112 (*)     Anion Gap 17 (*)     All other components within normal limits   URINALYSIS, REFLEX TO URINE CULTURE - Abnormal; Notable for the following components:    Ketones, UA 1+ (*)     All other components within normal limits    Narrative:     Specimen Source->Urine   ACETAMINOPHEN LEVEL - Abnormal; Notable for the following components:    Acetaminophen (Tylenol), Serum 93.3 (*)     All other components within normal limits   SALICYLATE LEVEL - Abnormal; Notable for the following components:    Salicylate Lvl <1.5 (*)     All other components within normal limits   ACETAMINOPHEN LEVEL - Abnormal; Notable for the following components:    Acetaminophen (Tylenol), Serum 156.9 (*)     All other components within normal limits   CBC W/ AUTO DIFFERENTIAL   TSH   DRUG SCREEN PANEL, URINE EMERGENCY    Narrative:     Specimen Source->Urine   ALCOHOL,MEDICAL (ETHANOL)   POCT URINE PREGNANCY          Results for orders placed or performed during the hospital  encounter of 12/16/23   CBC auto differential   Result Value Ref Range    WBC 8.45 3.90 - 12.70 K/uL    RBC 4.97 4.00 - 5.40 M/uL    Hemoglobin 14.6 12.0 - 16.0 g/dL    Hematocrit 42.9 37.0 - 48.5 %    MCV 86 82 - 98 fL    MCH 29.4 27.0 - 31.0 pg    MCHC 34.0 32.0 - 36.0 g/dL    RDW 12.3 11.5 - 14.5 %    Platelets 301 150 - 450 K/uL    MPV 9.5 9.2 - 12.9 fL    Immature Granulocytes 0.2 0.0 - 0.5 %    Gran # (ANC) 4.7 1.8 - 7.7 K/uL    Immature Grans (Abs) 0.02 0.00 - 0.04 K/uL    Lymph # 3.0 1.0 - 4.8 K/uL    Mono # 0.6 0.3 - 1.0 K/uL    Eos # 0.1 0.0 - 0.5 K/uL    Baso # 0.06 0.00 - 0.20 K/uL    nRBC 0 0 /100 WBC    Gran % 55.7 38.0 - 73.0 %    Lymph % 35.0 18.0 - 48.0 %    Mono % 7.6 4.0 - 15.0 %    Eosinophil % 0.8 0.0 - 8.0 %    Basophil % 0.7 0.0 - 1.9 %    Differential Method Automated    Comprehensive metabolic panel   Result Value Ref Range    Sodium 138 136 - 145 mmol/L    Potassium 3.8 3.5 - 5.1 mmol/L    Chloride 105 95 - 110 mmol/L    CO2 16 (L) 23 - 29 mmol/L    Glucose 112 (H) 70 - 110 mg/dL    BUN 9 6 - 20 mg/dL    Creatinine 1.0 0.5 - 1.4 mg/dL    Calcium 9.3 8.7 - 10.5 mg/dL    Total Protein 7.5 6.0 - 8.4 g/dL    Albumin 4.3 3.5 - 5.2 g/dL    Total Bilirubin 0.3 0.1 - 1.0 mg/dL    Alkaline Phosphatase 61 55 - 135 U/L    AST 17 10 - 40 U/L    ALT 24 10 - 44 U/L    eGFR >60.0 >60 mL/min/1.73 m^2    Anion Gap 17 (H) 8 - 16 mmol/L   TSH   Result Value Ref Range    TSH 2.914 0.340 - 5.600 uIU/mL   Urinalysis, Reflex to Urine Culture Urine, Clean Catch    Specimen: Urine, Clean Catch   Result Value Ref Range    Specimen UA Urine, Clean Catch     Color, UA Yellow Yellow, Straw, Lara    Appearance, UA Clear Clear    pH, UA 6.0 5.0 - 8.0    Specific Gravity, UA 1.020 1.005 - 1.030    Protein, UA Negative Negative    Glucose, UA Negative Negative    Ketones, UA 1+ (A) Negative    Bilirubin (UA) Negative Negative    Occult Blood UA Negative Negative    Nitrite, UA Negative Negative    Urobilinogen, UA  Negative Negative EU/dL    Leukocytes, UA Negative Negative   Drug screen panel, emergency   Result Value Ref Range    Benzodiazepines Negative Negative    Cocaine (Metab.) Negative Negative    Opiate Scrn, Ur Negative Negative    Barbiturate Screen, Ur Negative Negative    Amphetamine Screen, Ur Negative Negative    THC Negative Negative    Phencyclidine Negative Negative    Creatinine, Urine 71.4 15.0 - 325.0 mg/dL    Toxicology Information SEE COMMENT    Ethanol   Result Value Ref Range    Alcohol, Serum <10 <10 mg/dL   Acetaminophen level   Result Value Ref Range    Acetaminophen (Tylenol), Serum 93.3 (H) 10.0 - 20.0 ug/mL   Salicylate level   Result Value Ref Range    Salicylate Lvl <1.5 (L) 15.0 - 30.0 mg/dL   Acetaminophen level   Result Value Ref Range    Acetaminophen (Tylenol), Serum 156.9 (HH) 10.0 - 20.0 ug/mL   POCT urine pregnancy   Result Value Ref Range    POC Preg Test, Ur Negative Negative     Acceptable Yes        Imaging Results    None          Medications   acetylcysteine 20% (200 mg/ml) (ACETADOTE) 10,000 mg in dextrose 5 % (D5W) 1,000 mL infusion (has no administration in time range)   sodium chloride 0.9% flush 10 mL (has no administration in time range)   melatonin tablet 6 mg (has no administration in time range)   ondansetron injection 4 mg (4 mg Intravenous Given 12/17/23 0202)   acetylcysteine 20% (200 mg/ml) (ACETADOTE) 5,000 mg in dextrose 5 % (D5W) 500 mL infusion (has no administration in time range)   acetylcysteine 20% (200 mg/ml) (ACETADOTE) 5,000 mg in dextrose 5 % (D5W) 500 mL infusion (5,000 mg Intravenous New Bag 12/17/23 0202)   haloperidol lactate injection 5 mg (has no administration in time range)   LORazepam injection 2 mg (2 mg Intramuscular Given 12/16/23 1954)   diphenhydrAMINE injection 25 mg (25 mg Intramuscular Given 12/16/23 1959)   haloperidol lactate injection 5 mg (5 mg Intramuscular Given 12/16/23 1959)   acetylcysteine 20% (200 mg/ml)  (ACETADOTE) 15,000 mg in dextrose 5 % (D5W) 250 mL infusion (0 mg Intravenous Stopped 12/17/23 0145)   ondansetron injection 4 mg (4 mg Intravenous Given 12/16/23 5035)     Medical Decision Making  Differential includes but not limited to suicide attempt, depression, anxiety, overdose    Emergent evaluation of a 23-year-old female presents emergency department with overdose.  Patient took an extensive amount of Tylenol.  Discussed with poison control unclear exact time of onset but the possible 4 hour Tylenol level is at the threshold of treatment.  Option was for treating the patient.  Patient will need neck protocol until medically clear.  Patient pec by myself.  Patient will go to inpatient psychiatric after medically clear.  Patient will be admitted to Hospital Medicine.    A dictation software program was used for this note.  Please expect some simple typographical  errors in this note.     Amount and/or Complexity of Data Reviewed  Labs: ordered.    Risk  Prescription drug management.  Decision regarding hospitalization.              Attending Attestation:             Attending ED Notes:   Attending Critical Care:   Critical Care Times:   Direct Patient Care (initial evaluation, reassessments, and time considering the case)................................................................10 minutes.   Additional History from reviewing old medical records or taking additional history from the family, EMS, PCP, etc.......................10 minutes.   Ordering, Reviewing, and Interpreting Diagnostic Studies...............................................................................................................10 minutes.   Documentation..................................................................................................................................................................................5 minutes.   ==============================================================  · Total Critical Care  Time - exclusive of procedural time: 35 minutes.  ==============================================================  Critical care was necessary to treat or prevent imminent or life-threatening deterioration of the following conditions:  Overdose.   Critical care was time spent personally by me on the following activities: obtaining history from patient or relative, examination of patient, review of x-rays / CT sent with the patient, ordering lab, x-rays, and/or EKG, development of treatment plan with patient or relative, ordering and performing treatments and interventions, interpretation of cardiac measurements and re-evaluation of patient's conition.   Critical Care Condition: potentially life-threatening         ED Course as of 12/17/23 0324   Sat Dec 16, 2023   2041 EKG 20: 34 sinus tachycardia rate of 104.  No ST elevation no STEMI.  T-wave inverted inferiorly and laterally.  Abnormal EKG without any old EKG to compare to [JR]   2323 I discussed the case with Dr. Washington from Hospital Medicine who will admit the patient to the hospital [JR]      ED Course User Index  [JR] Teo Hernandez DO                           Clinical Impression:  Final diagnoses:  [Z00.8] Medical clearance for psychiatric admission  [T39.1X2A] Intentional acetaminophen overdose, initial encounter (Primary)          ED Disposition Condition    Admit Stable                Teo Hernandez,   12/17/23 0323       Teo Hernandez,   12/17/23 0324

## 2023-12-17 NOTE — H&P
[facility] - Emergency Dept  Hospital Medicine  History & Physical    Patient Name: Leticia Gurrola  MRN: [id number]  Patient Class: IP- Inpatient  Admission Date: 12/16/2023  Attending Physician: Cait Araiza MD  Primary Care Provider: Keiko Primary Doctor         Patient information was obtained from patient, EMS personnel, past medical records, and ER records.     Subjective:     Principal Problem:Suicide attempt    Chief Complaint:   Chief Complaint   Patient presents with    Mental Health Problem     Pt arrived by ems with suicidal ideations     Drug Overdose     Pt states she attempted suicide by taking 30 800mg ibuprofen and 1/2 a bottle of tylenol 500mg. Pt also has superficial lacerations to her left wrist and left thigh. Pt states abdomen pain        HPI: 23-year-old woman brought in for Tylenol overdose.  Patient had overdosed by Tylenol this evening for suicide attempt.  She was agitated and refusing care in the ED and given Haldol and Ativan.  She is currently very lethargic and refuses to answer questions.  She appears comfortable.  Her Tylenol level was very elevated and she was started on NAC protocol.  Pec was placed by ED physician.    No past medical history on file.    No past surgical history on file.    Review of patient's allergies indicates:  No Known Allergies    No current facility-administered medications on file prior to encounter.     Current Outpatient Medications on File Prior to Encounter   Medication Sig    naproxen (NAPROSYN) 500 MG tablet Take 1 tablet (500 mg total) by mouth 2 (two) times daily with meals.    oxyCODONE-acetaminophen (PERCOCET) 5-325 mg per tablet Take 1 tablet by mouth every 6 (six) hours as needed for Pain.     Family History    None       Tobacco Use    Smoking status: Never    Smokeless tobacco: Never   Substance and Sexual Activity    Alcohol use: Yes     Comment: occ    Drug use: Never    Sexual activity: Not on file       Objective:     Vital Signs  "(Most Recent):  Temp: 97.8 °F (36.6 °C) (12/16/23 1849)  Pulse: (!) 112 (12/16/23 1849)  Resp: 18 (12/16/23 1849)  BP: (!) 149/96 (12/16/23 1849)  SpO2: 100 % (12/16/23 1849) Vital Signs (24h Range):  Temp:  [97.8 °F (36.6 °C)] 97.8 °F (36.6 °C)  Pulse:  [112] 112  Resp:  [18] 18  SpO2:  [100 %] 100 %  BP: (149)/(96) 149/96     Weight: 108.9 kg (240 lb)  Body mass index is 35.44 kg/m².     Physical Exam  GENERAL:  Lethargic, no distress  HEENT:  EOMI. Conjunctivae intact.   NECK:  Supple   LUNGS:  No respiratory distress. Clear to auscultation bilaterally with good air movement  CARDIAC:  RRR without murmur, rub or gallop  ABDOMEN:  Soft,  Nontender and nondistended, no rebound or guarding, bowel sounds present   EXTREMITIES:  Peripheral pulses are 2+. Hands and feet are warm. Good capillary refill in fingers (< 2 seconds). No clubbing, cyanosis or edema  SKIN:  Skin color, texture and turgor normal. No rashes, ulcerations or nodules noted               Significant Labs: All pertinent labs within the past 24 hours have been reviewed.  Blood Culture: No results for input(s): "LABBLOO" in the last 48 hours.  CBC:   Recent Labs   Lab 12/16/23 2034   WBC 8.45   HGB 14.6   HCT 42.9        CMP:   Recent Labs   Lab 12/16/23 2034      K 3.8      CO2 16*   *   BUN 9   CREATININE 1.0   CALCIUM 9.3   PROT 7.5   ALBUMIN 4.3   BILITOT 0.3   ALKPHOS 61   AST 17   ALT 24   ANIONGAP 17*     Magnesium: No results for input(s): "MG" in the last 48 hours.  POCT Glucose: No results for input(s): "POCTGLUCOSE" in the last 48 hours.  Prealbumin: No results for input(s): "PREALBUMIN" in the last 48 hours.    Significant Imaging: I have reviewed all pertinent imaging results/findings within the past 24 hours.  Assessment/Plan:     * Suicide attempt  With acetaminophen overdose.  Started on NAC protocol.  Continue 21 hour protocol.  Monitor Tylenol level, CMP, INR.  Patient PEC'd.  Consult Psychiatry.  Continue " Haldol if needed for agitation        VTE Risk Mitigation (From admission, onward)      None                            Cait Araiza MD  Department of Hospital Medicine  [facility] - Emergency Dept

## 2023-12-17 NOTE — CONSULTS
[facility]  Department of Cardiology  Consult Note      PATIENT NAME: Leticia Gurrola  MRN: [id number]  TODAY'S DATE: 12/17/2023  ADMIT DATE: 12/16/2023                          CONSULT REQUESTED BY: Angela Brizuela MD    SUBJECTIVE     PRINCIPAL PROBLEM: Suicide attempt      REASON FOR CONSULT:  Transient heart block      HPI:    Patient is a 23 year old female who presented to the ED after attempted suicide with overdose of Tylenol and ibuprofen.  Patient was given Haldol and Ativan in the ED for agitation.  She then became lethargic.  Overnight she proceeded to have multiple episodes of vomiting during which she would short episodes of second-degree AV block Mobitz 1.  She also had a 6 second pause.   All these events occurred while patient was vomiting per chart review.  Denies CP, shortness of breath, or any acute cardiac symptoms.  No significant cardiac history.      During examination, patient is resting in bed. A&Ox3.  No acute complaints.  No further episodes of heart block noted on telemetry.  No evidence of 3rd degree heart block.   Troponin HS negative.  TWI noted in inferior and anterolateral leads.    FROM H&P   Mental Health Problem       Pt arrived by ems with suicidal ideations     Drug Overdose       Pt states she attempted suicide by taking 30 800mg ibuprofen and 1/2 a bottle of tylenol 500mg. Pt also has superficial lacerations to her left wrist and left thigh. Pt states abdomen pain         HPI: 23-year-old woman brought in for Tylenol overdose.  Patient had overdosed by Tylenol this evening for suicide attempt.  She was agitated and refusing care in the ED and given Haldol and Ativan.  She is currently very lethargic and refuses to answer questions.  She appears comfortable.  Her Tylenol level was very elevated and she was started on NAC protocol.  Pec was placed by ED physician.     No past medical history on file.     No past surgical history on file.      Review of patient's  allergies indicates:  No Known Allergies    No past medical history on file.  No past surgical history on file.  Social History     Tobacco Use    Smoking status: Never    Smokeless tobacco: Never   Substance Use Topics    Alcohol use: Yes     Comment: occ    Drug use: Never        REVIEW OF SYSTEMS    As mentioned in HPI    OBJECTIVE     VITAL SIGNS (Most Recent)  Temp: 97.8 °F (36.6 °C) (12/17/23 1101)  Pulse: 91 (12/17/23 1501)  Resp: (!) 34 (12/17/23 1501)  BP: 127/68 (12/17/23 1501)  SpO2: 99 % (12/17/23 1301)    VENTILATION STATUS  Resp: (!) 34 (12/17/23 1501)  SpO2: 99 % (12/17/23 1301)           I & O (Last 24H):  Intake/Output Summary (Last 24 hours) at 12/17/2023 1649  Last data filed at 12/17/2023 1234  Gross per 24 hour   Intake 1680 ml   Output 400 ml   Net 1280 ml       WEIGHTS  Wt Readings from Last 3 Encounters:   12/17/23 0530 113 kg (249 lb 1.9 oz)   12/17/23 0121 113 kg (249 lb 1.9 oz)   12/16/23 1849 108.9 kg (240 lb)   12/17/23 0901 113 kg (249 lb 1.9 oz)   06/07/23 2149 113.4 kg (250 lb)       PHYSICAL EXAM    CONSTITUTIONAL: NAD  HEENT: Normocephalic. No pallor  NECK: no JVD  LUNGS: CTA b/l  HEART: regular rate and rhythm, S1, S2 normal, no murmur   ABDOMEN: soft, non-tender, bowel sounds normal  EXTREMITIES: No edema  SKIN: No rash  NEURO: AAO X 3  PSYCH: normal affect      HOME MEDICATIONS:  No current facility-administered medications on file prior to encounter.     Current Outpatient Medications on File Prior to Encounter   Medication Sig Dispense Refill    naproxen (NAPROSYN) 500 MG tablet Take 1 tablet (500 mg total) by mouth 2 (two) times daily with meals. 30 tablet 0    oxyCODONE-acetaminophen (PERCOCET) 5-325 mg per tablet Take 1 tablet by mouth every 6 (six) hours as needed for Pain. 8 tablet 0       SCHEDULED MEDS:   acetylcysteine  10,000 mg Intravenous Once       CONTINUOUS INFUSIONS:    PRN MEDS:calcium gluconate IVPB, calcium gluconate IVPB, calcium gluconate IVPB, magnesium  "sulfate IVPB, magnesium sulfate IVPB, melatonin, ondansetron, potassium chloride **AND** potassium chloride **AND** potassium chloride, sodium chloride 0.9%, sodium phosphate 15 mmol in dextrose 5 % (D5W) 250 mL IVPB, sodium phosphate 20.01 mmol in dextrose 5 % (D5W) 250 mL IVPB, sodium phosphate 30 mmol in dextrose 5 % (D5W) 250 mL IVPB    LABS AND DIAGNOSTICS     CBC LAST 3 DAYS  Recent Labs   Lab 12/16/23 2034 12/17/23 0419   WBC 8.45 5.41   RBC 4.97 5.18   HGB 14.6 15.1   HCT 42.9 44.4   MCV 86 86   MCH 29.4 29.2   MCHC 34.0 34.0   RDW 12.3 12.4    305   MPV 9.5 9.2   GRAN 55.7  4.7 57.1  3.1   LYMPH 35.0  3.0 32.9  1.8   MONO 7.6  0.6 8.7  0.5   BASO 0.06 0.04   NRBC 0 0       COAGULATION LAST 3 DAYS  Recent Labs   Lab 12/17/23 0419   INR 1.0       CHEMISTRY LAST 3 DAYS  Recent Labs   Lab 12/16/23 2034 12/17/23 0419    139   K 3.8 4.2    107   CO2 16* 19*   ANIONGAP 17* 13   BUN 9 9   CREATININE 1.0 0.9   * 132*   CALCIUM 9.3 9.3   MG  --  2.1   ALBUMIN 4.3 4.5   PROT 7.5 7.8   ALKPHOS 61 55   ALT 24 26   AST 17 16   BILITOT 0.3 0.3       CARDIAC PROFILE LAST 3 DAYS  Recent Labs   Lab 12/17/23 0419   TROPONINIHS <2.3       ENDOCRINE LAST 3 DAYS  Recent Labs   Lab 12/16/23 2034   TSH 2.914       LAST ARTERIAL BLOOD GAS  ABG  No results for input(s): "PH", "PO2", "PCO2", "HCO3", "BE" in the last 168 hours.    LAST 7 DAYS MICROBIOLOGY   Microbiology Results (last 7 days)       ** No results found for the last 168 hours. **            MOST RECENT IMAGING  No image results found.      ECHOCARDIOGRAM RESULTS (last 5)  No results found for this or any previous visit.      CURRENT/PREVIOUS VISIT EKG  Results for orders placed or performed during the hospital encounter of 12/16/23   EKG 12-lead    Collection Time: 12/17/23  3:54 AM    Narrative    Test Reason : I45.9,    Vent. Rate : 074 BPM     Atrial Rate : 074 BPM     P-R Int : 164 ms          QRS Dur : 084 ms      QT Int : 420 " ms       P-R-T Axes : 037 007 -18 degrees     QTc Int : 466 ms    Normal sinus rhythm  T wave abnormality, consider anterolateral ischemia  Prolonged QT  Abnormal ECG  When compared with ECG of 16-DEC-2023 20:34,  No significant change was found  Confirmed by Leeanna MOREJON, Jose ISABEL ([id number]) on 12/17/2023 1:06:29 PM    Referred By: LOVE   SELF           Confirmed By:Jose Durán MD           ASSESSMENT/PLAN:     Active Hospital Problems    Diagnosis    *Suicide attempt       ASSESSMENT & PLAN:     2nd degree AV block Mobitz Type I   6 second pause  Suicide attempt  Overdose      RECOMMENDATIONS:    Multiple short episodes of 2nd degree AV block Mobitz I noted on telemetry.  6 second pause also noted on tele.  Events occurred during episodes of vomiting. Likely vagal response.  No further arrhythmias noted on tele. Patient is tolerated PO at this time.  Denies CP or anginal equivalent symptoms.   Recommend OOB and ambulate in garcia.    Echo pending.  Further recommendations to follow.  Patient will need a 30 day outpatient heart monitor and follow up in cardiology clinic outpatient.   Thank you for the consult.  We will sign off at this time.       Sylvia Velásquez NP  Department of Cardiology  Date of Service: 12/17/2023

## 2023-12-17 NOTE — HPI
23-year-old woman brought in for Tylenol overdose.  Patient had overdosed by Tylenol this evening for suicide attempt.  She was agitated and refusing care in the ED and given Haldol and Ativan.  She is currently very lethargic and refuses to answer questions.  She appears comfortable.  Her Tylenol level was very elevated and she was started on NAC protocol.  Pec was placed by ED physician.

## 2023-12-17 NOTE — PROGRESS NOTES
Notified by RN that tele called with report of HR in the 30s transiently on 3 occasions with the last appearing like heart block. I discussed with tele monitoring room and less than 20 seconds.  RN reports these episodes were occurring while she was vomiting.  I have discontinued the Haldol in the event it was playing any role.  Ordering an EKG, Troponin, Echo and consulting cardiology. Checking a Mg level.  K is 3.8. Not on jayda blocking agents. Will continue to monitor for now.  If is recurring, will plan to move to Telemetry.    Addendum:  Patient subsequently had a 6 second pause that was asymptomatic.  Pacer pads to be placed on patient empirically and she will be moved to Step down for higher level of care.

## 2023-12-17 NOTE — SUBJECTIVE & OBJECTIVE
"No past medical history on file.    No past surgical history on file.    Review of patient's allergies indicates:  No Known Allergies    No current facility-administered medications on file prior to encounter.     Current Outpatient Medications on File Prior to Encounter   Medication Sig    naproxen (NAPROSYN) 500 MG tablet Take 1 tablet (500 mg total) by mouth 2 (two) times daily with meals.    oxyCODONE-acetaminophen (PERCOCET) 5-325 mg per tablet Take 1 tablet by mouth every 6 (six) hours as needed for Pain.     Family History    None       Tobacco Use    Smoking status: Never    Smokeless tobacco: Never   Substance and Sexual Activity    Alcohol use: Yes     Comment: occ    Drug use: Never    Sexual activity: Not on file       Objective:     Vital Signs (Most Recent):  Temp: 97.8 °F (36.6 °C) (12/16/23 1849)  Pulse: (!) 112 (12/16/23 1849)  Resp: 18 (12/16/23 1849)  BP: (!) 149/96 (12/16/23 1849)  SpO2: 100 % (12/16/23 1849) Vital Signs (24h Range):  Temp:  [97.8 °F (36.6 °C)] 97.8 °F (36.6 °C)  Pulse:  [112] 112  Resp:  [18] 18  SpO2:  [100 %] 100 %  BP: (149)/(96) 149/96     Weight: 108.9 kg (240 lb)  Body mass index is 35.44 kg/m².     Physical Exam  GENERAL:  Lethargic, no distress  HEENT:  EOMI. Conjunctivae intact.   NECK:  Supple   LUNGS:  No respiratory distress. Clear to auscultation bilaterally with good air movement  CARDIAC:  RRR without murmur, rub or gallop  ABDOMEN:  Soft,  Nontender and nondistended, no rebound or guarding, bowel sounds present   EXTREMITIES:  Peripheral pulses are 2+. Hands and feet are warm. Good capillary refill in fingers (< 2 seconds). No clubbing, cyanosis or edema  SKIN:  Skin color, texture and turgor normal. No rashes, ulcerations or nodules noted               Significant Labs: All pertinent labs within the past 24 hours have been reviewed.  Blood Culture: No results for input(s): "LABBLOO" in the last 48 hours.  CBC:   Recent Labs   Lab 12/16/23 2034   WBC 8.45   HGB " "14.6   HCT 42.9        CMP:   Recent Labs   Lab 12/16/23 2034      K 3.8      CO2 16*   *   BUN 9   CREATININE 1.0   CALCIUM 9.3   PROT 7.5   ALBUMIN 4.3   BILITOT 0.3   ALKPHOS 61   AST 17   ALT 24   ANIONGAP 17*     Magnesium: No results for input(s): "MG" in the last 48 hours.  POCT Glucose: No results for input(s): "POCTGLUCOSE" in the last 48 hours.  Prealbumin: No results for input(s): "PREALBUMIN" in the last 48 hours.    Significant Imaging: I have reviewed all pertinent imaging results/findings within the past 24 hours.  "

## 2023-12-17 NOTE — PLAN OF CARE
Counts include 234 beds at the [facility]  Initial Discharge Assessment       Primary Care Provider: No, Primary Doctor    Admission Diagnosis: Intentional acetaminophen overdose, initial encounter [T39.1X2A]    Admission Date: 12/16/2023  Expected Discharge Date: TBD  Met with patient at bedside to complete assessment. No POA, living will or advance directive. No HH, HD, DME or Coumadin. Patient has been admitted under a PEC. Patient presently states that she is suicidal with no plan. Patient denied homicidal ideation. Patient will likely transfer to an inpatient psychiatric facility upon discharge.    Transition of Care Barriers: Mental illness, No family/friends to help, Unisured    Payor: /     Extended Emergency Contact Information  Primary Emergency Contact: Randy Bravo  Mobile Phone: [phone]  Relation: Significant other  Preferred language: English   needed? No  Secondary Emergency Contact: Mckenna Gurrola  Mobile Phone: [phone]  Relation: Mother  Preferred language: English   needed? No    Discharge Plan A: Psychiatric hospital  Discharge Plan B: [id number] [place] [address]  [address] [place] [address]  Phone: [phone] Fax: [phone]      Initial Assessment (most recent)       Adult Discharge Assessment - 12/17/23 1624          Discharge Assessment    Assessment Type Discharge Planning Assessment     Confirmed/corrected address, phone number and insurance Yes     Confirmed Demographics Correct on Facesheet     Source of Information patient     When was your last doctors appointment? --   Patient has no insurance    Communicated OLGA with patient/caregiver Date not available/Unable to determine     Reason For Admission suicide attempt     People in Home alone     Facility Arrived From: home     Do you expect to return to your current living situation? --   May likely have to transfer to inpatient psych  facility    Do you have help at home or someone to help you manage your care at home? No     Prior to hospitilization cognitive status: Alert/Oriented     Current cognitive status: Alert/Oriented     Walking or Climbing Stairs Difficulty no     Dressing/Bathing Difficulty no     Equipment Currently Used at Home none     Do you currently have service(s) that help you manage your care at home? No     Do you take prescription medications? No     Do you have prescription coverage? No     Do you have any problems affording any of your prescribed medications? No     Is the patient taking medications as prescribed? --   no prescribed medications    Are you on dialysis? No     Do you take coumadin? No     Discharge Plan A Psychiatric hospital     Discharge Plan B Psychiatric hospital     DME Needed Upon Discharge  none     Discharge Plan discussed with: Patient     Transition of Care Barriers Mental illness;No family/friends to help;Unisured

## 2023-12-18 VITALS
RESPIRATION RATE: 38 BRPM | BODY MASS INDEX: 35.66 KG/M2 | DIASTOLIC BLOOD PRESSURE: 89 MMHG | SYSTOLIC BLOOD PRESSURE: 144 MMHG | WEIGHT: 249.13 LBS | HEIGHT: 70 IN | TEMPERATURE: 99 F | OXYGEN SATURATION: 96 % | HEART RATE: 83 BPM

## 2023-12-18 PROBLEM — F43.20 ADJUSTMENT DISORDER: Status: ACTIVE | Noted: 2023-12-18

## 2023-12-18 PROBLEM — F41.1 GAD (GENERALIZED ANXIETY DISORDER): Status: ACTIVE | Noted: 2023-12-18

## 2023-12-18 PROBLEM — F33.2 SEVERE EPISODE OF RECURRENT MAJOR DEPRESSIVE DISORDER, WITHOUT PSYCHOTIC FEATURES: Status: ACTIVE | Noted: 2023-12-18

## 2023-12-18 PROBLEM — F40.10 SOCIAL ANXIETY DISORDER: Status: ACTIVE | Noted: 2023-12-18

## 2023-12-18 PROBLEM — F60.89 CLUSTER B PERSONALITY DISORDER: Status: ACTIVE | Noted: 2023-12-18

## 2023-12-18 LAB
ALBUMIN SERPL BCP-MCNC: 4.1 G/DL (ref 3.5–5.2)
ALP SERPL-CCNC: 68 U/L (ref 55–135)
ALT SERPL W/O P-5'-P-CCNC: 18 U/L (ref 10–44)
ANION GAP SERPL CALC-SCNC: 10 MMOL/L (ref 8–16)
AST SERPL-CCNC: 12 U/L (ref 10–40)
BASOPHILS # BLD AUTO: 0.07 K/UL (ref 0–0.2)
BASOPHILS NFR BLD: 0.9 % (ref 0–1.9)
BILIRUB SERPL-MCNC: 0.3 MG/DL (ref 0.1–1)
BUN SERPL-MCNC: 11 MG/DL (ref 6–20)
CALCIUM SERPL-MCNC: 9.4 MG/DL (ref 8.7–10.5)
CHLORIDE SERPL-SCNC: 106 MMOL/L (ref 95–110)
CO2 SERPL-SCNC: 22 MMOL/L (ref 23–29)
CREAT SERPL-MCNC: 0.8 MG/DL (ref 0.5–1.4)
DIFFERENTIAL METHOD: ABNORMAL
EOSINOPHIL # BLD AUTO: 0.1 K/UL (ref 0–0.5)
EOSINOPHIL NFR BLD: 1.3 % (ref 0–8)
ERYTHROCYTE [DISTWIDTH] IN BLOOD BY AUTOMATED COUNT: 12.6 % (ref 11.5–14.5)
EST. GFR  (NO RACE VARIABLE): >60 ML/MIN/1.73 M^2
GLUCOSE SERPL-MCNC: 112 MG/DL (ref 70–110)
HCT VFR BLD AUTO: 43.1 % (ref 37–48.5)
HGB BLD-MCNC: 14.2 G/DL (ref 12–16)
IMM GRANULOCYTES # BLD AUTO: 0.01 K/UL (ref 0–0.04)
IMM GRANULOCYTES NFR BLD AUTO: 0.1 % (ref 0–0.5)
LYMPHOCYTES # BLD AUTO: 3.8 K/UL (ref 1–4.8)
LYMPHOCYTES NFR BLD: 51.3 % (ref 18–48)
MCH RBC QN AUTO: 28.9 PG (ref 27–31)
MCHC RBC AUTO-ENTMCNC: 32.9 G/DL (ref 32–36)
MCV RBC AUTO: 88 FL (ref 82–98)
MONOCYTES # BLD AUTO: 0.6 K/UL (ref 0.3–1)
MONOCYTES NFR BLD: 8 % (ref 4–15)
NEUTROPHILS # BLD AUTO: 2.9 K/UL (ref 1.8–7.7)
NEUTROPHILS NFR BLD: 38.4 % (ref 38–73)
NRBC BLD-RTO: 0 /100 WBC
PLATELET # BLD AUTO: 305 K/UL (ref 150–450)
PMV BLD AUTO: 9.7 FL (ref 9.2–12.9)
POTASSIUM SERPL-SCNC: 4.1 MMOL/L (ref 3.5–5.1)
PROT SERPL-MCNC: 6.9 G/DL (ref 6–8.4)
RBC # BLD AUTO: 4.92 M/UL (ref 4–5.4)
SODIUM SERPL-SCNC: 138 MMOL/L (ref 136–145)
WBC # BLD AUTO: 7.48 K/UL (ref 3.9–12.7)

## 2023-12-18 PROCEDURE — 99221 1ST HOSP IP/OBS SF/LOW 40: CPT | Mod: ,,, | Performed by: FAMILY MEDICINE

## 2023-12-18 PROCEDURE — 99221 PR INITIAL HOSPITAL CARE,LEVL I: ICD-10-PCS | Mod: ,,, | Performed by: FAMILY MEDICINE

## 2023-12-18 PROCEDURE — 85025 COMPLETE CBC W/AUTO DIFF WBC: CPT | Performed by: HOSPITALIST

## 2023-12-18 PROCEDURE — 94761 N-INVAS EAR/PLS OXIMETRY MLT: CPT

## 2023-12-18 PROCEDURE — 36415 COLL VENOUS BLD VENIPUNCTURE: CPT | Performed by: HOSPITALIST

## 2023-12-18 PROCEDURE — 80053 COMPREHEN METABOLIC PANEL: CPT | Performed by: HOSPITALIST

## 2023-12-18 PROCEDURE — 99900031 HC PATIENT EDUCATION (STAT)

## 2023-12-18 NOTE — PLAN OF CARE
Pt clear for DC from case management standpoint. Discharging to [facility] psych       12/18/23 1110   Final Note   Assessment Type Final Discharge Note   Anticipated Discharge Disposition Psych

## 2023-12-18 NOTE — HOSPITAL COURSE
Patient admitted with suicide attempt by tylenol overdose. She was PEC'd in the ED. 's office notified. Poison control gave direction on antidote. She was given acetylcysteine infusion. Acetaminophen level is in normal range. She has no acute issues and is stable for transfer to inpatient psych facility.     Patient discharging to [facility] inpatient psych unit.        Yovani Carolina DO  Physician  Wound Care     Consults      Signed     Creation Time: 12/18/2023  7:24 PM     Expand All Collapse All    Chief complaint:  Mental Health Problem (Pt arrived by ems with suicidal ideations ) and Drug Overdose (Pt states she attempted suicide by taking 30 800mg ibuprofen and 1/2 a bottle of tylenol 500mg. Pt also has superficial lacerations to her left wrist and left thigh. Pt states abdomen pain)        HPI:  Leticia Gurrola is a 23 y.o. female presenting with multiple self inflicted lacerations of the left arm and left thigh. Pt was admitted for an attempted suicide, from tylenol OD, and the lacerations were found on admission. Pt has no other complaints today.     PMH:  As per HPI and below:  No past medical history on file.     Social History            Socioeconomic History    Marital status: Single   Tobacco Use    Smoking status: Never    Smokeless tobacco: Current   Substance and Sexual Activity    Alcohol use: Not Currently       Comment: occ    Drug use: Never    Sexual activity: Not Currently   Other Topics Concern    Patient feels they ought to cut down on drinking/drug use No    Patient annoyed by others criticizing their drinking/drug use No    Patient has felt bad or guilty about drinking/drug use No    Patient has had a drink/used drugs as an eye opener in the AM No         No past surgical history on file.     No family history on file.     Review of patient's allergies indicates:  No Known Allergies     No current facility-administered medications on file prior to encounter.             Current  "Outpatient Medications on File Prior to Encounter   Medication Sig Dispense Refill    naproxen (NAPROSYN) 500 MG tablet Take 1 tablet (500 mg total) by mouth 2 (two) times daily with meals. 30 tablet 0    [DISCONTINUED] oxyCODONE-acetaminophen (PERCOCET) 5-325 mg per tablet Take 1 tablet by mouth every 6 (six) hours as needed for Pain. 8 tablet 0         ROS: As per HPI and below:  Pertinent items are noted in HPI.        Physical Exam:      Vitals          Vitals:     23 0901 23 1001 23 1101 23 1256   BP: (!) 144/89   (!) 144/89     Pulse:   71 83     Resp:   14 (!) 38     Temp:     98.7 °F (37.1 °C)     TempSrc:     Axillary     SpO2:   96%   96%   Weight:           Height:                    BP  Min: 95/60  Max: 149/96  Temp  Av.9 °F (36.6 °C)  Min: 97.4 °F (36.3 °C)  Max: 98.8 °F (37.1 °C)  Pulse  Av.4  Min: 69  Max: 112  Resp  Av.6  Min: 12  Max: 38  SpO2  Av.5 %  Min: 96 %  Max: 100 %  Height  Av' 9.6" (176.8 cm)  Min: 5' 9" (175.3 cm)  Max: 5' 10" (177.8 cm)  Weight  Av.4 kg (247 lb 13.1 oz)  Min: 108.9 kg (240 lb)  Max: 113 kg (249 lb 1.9 oz)     Body mass index is 35.74 kg/m².              General:             Well developed, well nourished, no apparent distress  HEENT:              NCAT, no JVD, mucous membranes moist, EOM intact  Cardiovascular:  Regular rate and rhythm, normal S1, normal S2, No murmurs, rubs, or gallops  Respiratory:        Normal breath sounds, no wheezes, no rales, no rhonchi  Abdomen:           Bowel sounds present, non tender, non distended, no masses, no hepatojugular reflux  Extremities:        No clubbing, no cyanosis, no edema  Vascular:            2+ b/l radial.  Peripheral pulses intact.  No carotid bruits.  Neurological:      No focal deficits  Skin:                   No obvious rashes or erythema, multiple self inflicted lacerations of the left arm and left thigh                     Lab Results   Component Value Date     " "WBC 7.48 12/18/2023     HGB 14.2 12/18/2023     HCT 43.1 12/18/2023     MCV 88 12/18/2023      12/18/2023      No results found for: "CHOL"  No results found for: "HDL"  No results found for: "LDLCALC"  No results found for: "TRIG"  No results found for: "CHOLHDL"  CMP      Recent Labs   Lab 12/18/23  0321   *   CALCIUM 9.4   ALBUMIN 4.1   PROT 6.9      K 4.1   CO2 22*      BUN 11   CREATININE 0.8   ALKPHOS 68   ALT 18   AST 12   BILITOT 0.3                       Lab Results   Component Value Date     TSH 2.914 12/16/2023            Assessment and Recommendations         Diagnoses:    1. Multiple lacerations of the left arm  2. Multiple lacerations of the left thigh     Plan:  1. Xeroform + mepilex to all lacerations daily     Complexity:     moderate              "

## 2023-12-18 NOTE — SUBJECTIVE & OBJECTIVE
Interval History: patient is tearful regarding PEC and potential inpatient psych stay    Review of Systems   Constitutional: Negative.    Respiratory: Negative.     Cardiovascular: Negative.    Gastrointestinal: Negative.    Genitourinary: Negative.    Musculoskeletal: Negative.    Neurological: Negative.    Psychiatric/Behavioral:  Positive for dysphoric mood.      Objective:     Vital Signs (Most Recent):  Temp: 98.8 °F (37.1 °C) (12/18/23 0701)  Pulse: 78 (12/18/23 0701)  Resp: 12 (12/18/23 0701)  BP: (!) 144/89 (12/18/23 0701)  SpO2: 97 % (12/18/23 0806) Vital Signs (24h Range):  Temp:  [97.4 °F (36.3 °C)-98.8 °F (37.1 °C)] 98.8 °F (37.1 °C)  Pulse:  [78-96] 78  Resp:  [12-34] 12  SpO2:  [96 %-99 %] 97 %  BP: (101-145)/(61-92) 144/89     Weight: 113 kg (249 lb 1.9 oz)  Body mass index is 35.74 kg/m².    Intake/Output Summary (Last 24 hours) at 12/18/2023 0829  Last data filed at 12/18/2023 0333  Gross per 24 hour   Intake 1840 ml   Output --   Net 1840 ml         Physical Exam  Constitutional:       General: She is not in acute distress.     Appearance: She is obese.   HENT:      Head: Normocephalic and atraumatic.   Eyes:      Pupils: Pupils are equal, round, and reactive to light.   Cardiovascular:      Rate and Rhythm: Normal rate and regular rhythm.      Pulses: Normal pulses.   Pulmonary:      Effort: Pulmonary effort is normal.      Breath sounds: Normal breath sounds. No wheezing.   Abdominal:      General: Bowel sounds are normal. There is no distension.      Tenderness: There is no abdominal tenderness.   Musculoskeletal:         General: No deformity. Normal range of motion.      Cervical back: Normal range of motion and neck supple.   Skin:     General: Skin is warm and dry.      Capillary Refill: Capillary refill takes less than 2 seconds.   Neurological:      General: No focal deficit present.      Mental Status: She is alert and oriented to person, place, and time. Mental status is at baseline.    Psychiatric:      Comments: Tearful and cooperative              Significant Labs: All pertinent labs within the past 24 hours have been reviewed.  Recent Lab Results         12/18/23  0321   12/17/23  2133   12/17/23  1614   12/17/23  0925        Acetaminophen (Tylenol), Serum     13.7  Comment: Toxic Levels:  Adults (4 hr post-ingestion).........>150 ug/mL  Adults (12 hr post-ingestion)........>40 ug/mL  Peds (2 hr post-ingestion, liquid)...>225 ug/mL           Albumin 4.1   4.2           ALP 68   58           ALT 18   20           Anion Gap 10             Ao root annulus       3.10       Ascending aorta       2.70       Ao peak tarik       1.15       AST 12   12           DARYL by Velocity Ratio       2.95       AORTIC VALVE CUSP SEPERATION       2.00       AV peak gradient       5       AV Velocity Ratio       0.85       Baso # 0.07             Basophil % 0.9             Bilirubin Direct   0.0           BILIRUBIN TOTAL 0.3  Comment: For infants and newborns, interpretation of results should be based  on gestational age, weight and in agreement with clinical  observations.    Premature Infant recommended reference ranges:  Up to 24 hours.............<8.0 mg/dL  Up to 48 hours............<12.0 mg/dL  3-5 days..................<15.0 mg/dL  6-29 days.................<15.0 mg/dL     0.3  Comment: For infants and newborns, interpretation of results should be based  on gestational age, weight and in agreement with clinical  observations.    Premature Infant recommended reference ranges:  Up to 24 hours.............<8.0 mg/dL  Up to 48 hours............<12.0 mg/dL  3-5 days..................<15.0 mg/dL  6-29 days.................<15.0 mg/dL             BSA       2.36       BUN 11             Calcium 9.4             Chloride 106             CO2 22             Creatinine 0.8             Left Ventricle Relative Wall Thickness       0.46       Differential Method Automated             E/A ratio       1.35       eGFR >60.0              Eos # 0.1             Eosinophil % 1.3             E wave deceleration time       201.00       FS       39       Glucose 112             Gran # (ANC) 2.9             Gran % 38.4             Hematocrit 43.1             Hemoglobin 14.2             Immature Grans (Abs) 0.01  Comment: Mild elevation in immature granulocytes is non specific and   can be seen in a variety of conditions including stress response,   acute inflammation, trauma and pregnancy. Correlation with other   laboratory and clinical findings is essential.               Immature Granulocytes 0.1             IVSd       0.97       LA size       2.00       LVOT area       3.5       LV EDV BP       75.50       LV Diastolic Volume Index       32.97       LVIDd       4.13       LVIDs       2.53       LV mass       126.23       LV Mass Index       55       LVOT diameter       2.10       LVOT peak perez       0.98       LV ESV BP       23.00       LV Systolic Volume Index       10.0       Lymph # 3.8             Lymph % 51.3             MCH 28.9             MCHC 32.9             MCV 88             Mono # 0.6             Mono % 8.0             MPV 9.7             MV valve area p 1/2 method       2.06       MV mean gradient       2       MV peak gradient       3       MV Peak A Perez       0.57       MV Peak E Perez       0.77       MV stenosis pressure 1/2 time       107.00       MV VTI       19.5       nRBC 0             Platelet Count 305             Potassium 4.1             PROTEIN TOTAL 6.9   7.3           Pulmonary Valve Mean Velocity       0.67       PV peak gradient       4       PV PEAK VELOCITY       0.98       Posterior Wall       0.95       RBC 4.92             RDW 12.6             RVDD       2.18       Sodium 138             Triscuspid Valve Regurgitation Peak Gradient       23       TR Max Perez       2.39       WBC 7.48             ZLVIDD       -7.51       ZLVIDS       -5.82               Significant Imaging: I have reviewed all pertinent imaging  results/findings within the past 24 hours.

## 2023-12-18 NOTE — PROGRESS NOTES
[facility] Medicine  Progress Note    Patient Name: Leticia Gurrola  MRN: [id number]  Patient Class: IP- Inpatient   Admission Date: 12/16/2023  Length of Stay: 2 days  Attending Physician: Angela Brizuela MD  Primary Care Provider: Keiko, Primary Doctor        Subjective:     Principal Problem:Suicide attempt        HPI:  23-year-old woman brought in for Tylenol overdose.  Patient had overdosed by Tylenol this evening for suicide attempt.  She was agitated and refusing care in the ED and given Haldol and Ativan.  She is currently very lethargic and refuses to answer questions.  She appears comfortable.  Her Tylenol level was very elevated and she was started on NAC protocol.  Pec was placed by ED physician.    Overview/Hospital Course:  Patient admitted with suicide attempt by tylenol overdose. She was PEC'd in the ED. 's office notified. Poison control gave direction on antidote. She was given acetylcysteine infusion. Acetaminophen level is in normal range. She has no acute issues and is stable for transfer to inpatient psych facility.     Interval History: patient is tearful regarding PEC and potential inpatient psych stay    Review of Systems   Constitutional: Negative.    Respiratory: Negative.     Cardiovascular: Negative.    Gastrointestinal: Negative.    Genitourinary: Negative.    Musculoskeletal: Negative.    Neurological: Negative.    Psychiatric/Behavioral:  Positive for dysphoric mood.      Objective:     Vital Signs (Most Recent):  Temp: 98.8 °F (37.1 °C) (12/18/23 0701)  Pulse: 78 (12/18/23 0701)  Resp: 12 (12/18/23 0701)  BP: (!) 144/89 (12/18/23 0701)  SpO2: 97 % (12/18/23 0806) Vital Signs (24h Range):  Temp:  [97.4 °F (36.3 °C)-98.8 °F (37.1 °C)] 98.8 °F (37.1 °C)  Pulse:  [78-96] 78  Resp:  [12-34] 12  SpO2:  [96 %-99 %] 97 %  BP: (101-145)/(61-92) 144/89     Weight: 113 kg (249 lb 1.9 oz)  Body mass index is 35.74 kg/m².    Intake/Output Summary (Last 24 hours) at  12/18/2023 0829  Last data filed at 12/18/2023 0333  Gross per 24 hour   Intake 1840 ml   Output --   Net 1840 ml         Physical Exam  Constitutional:       General: She is not in acute distress.     Appearance: She is obese.   HENT:      Head: Normocephalic and atraumatic.   Eyes:      Pupils: Pupils are equal, round, and reactive to light.   Cardiovascular:      Rate and Rhythm: Normal rate and regular rhythm.      Pulses: Normal pulses.   Pulmonary:      Effort: Pulmonary effort is normal.      Breath sounds: Normal breath sounds. No wheezing.   Abdominal:      General: Bowel sounds are normal. There is no distension.      Tenderness: There is no abdominal tenderness.   Musculoskeletal:         General: No deformity. Normal range of motion.      Cervical back: Normal range of motion and neck supple.   Skin:     General: Skin is warm and dry.      Capillary Refill: Capillary refill takes less than 2 seconds.   Neurological:      General: No focal deficit present.      Mental Status: She is alert and oriented to person, place, and time. Mental status is at baseline.   Psychiatric:      Comments: Tearful and cooperative              Significant Labs: All pertinent labs within the past 24 hours have been reviewed.  Recent Lab Results         12/18/23  0321   12/17/23  2133   12/17/23  1614   12/17/23  0925        Acetaminophen (Tylenol), Serum     13.7  Comment: Toxic Levels:  Adults (4 hr post-ingestion).........>150 ug/mL  Adults (12 hr post-ingestion)........>40 ug/mL  Peds (2 hr post-ingestion, liquid)...>225 ug/mL           Albumin 4.1   4.2           ALP 68   58           ALT 18   20           Anion Gap 10             Ao root annulus       3.10       Ascending aorta       2.70       Ao peak tarik       1.15       AST 12   12           DARYL by Velocity Ratio       2.95       AORTIC VALVE CUSP SEPERATION       2.00       AV peak gradient       5       AV Velocity Ratio       0.85       Baso # 0.07              Basophil % 0.9             Bilirubin Direct   0.0           BILIRUBIN TOTAL 0.3  Comment: For infants and newborns, interpretation of results should be based  on gestational age, weight and in agreement with clinical  observations.    Premature Infant recommended reference ranges:  Up to 24 hours.............<8.0 mg/dL  Up to 48 hours............<12.0 mg/dL  3-5 days..................<15.0 mg/dL  6-29 days.................<15.0 mg/dL     0.3  Comment: For infants and newborns, interpretation of results should be based  on gestational age, weight and in agreement with clinical  observations.    Premature Infant recommended reference ranges:  Up to 24 hours.............<8.0 mg/dL  Up to 48 hours............<12.0 mg/dL  3-5 days..................<15.0 mg/dL  6-29 days.................<15.0 mg/dL             BSA       2.36       BUN 11             Calcium 9.4             Chloride 106             CO2 22             Creatinine 0.8             Left Ventricle Relative Wall Thickness       0.46       Differential Method Automated             E/A ratio       1.35       eGFR >60.0             Eos # 0.1             Eosinophil % 1.3             E wave deceleration time       201.00       FS       39       Glucose 112             Gran # (ANC) 2.9             Gran % 38.4             Hematocrit 43.1             Hemoglobin 14.2             Immature Grans (Abs) 0.01  Comment: Mild elevation in immature granulocytes is non specific and   can be seen in a variety of conditions including stress response,   acute inflammation, trauma and pregnancy. Correlation with other   laboratory and clinical findings is essential.               Immature Granulocytes 0.1             IVSd       0.97       LA size       2.00       LVOT area       3.5       LV EDV BP       75.50       LV Diastolic Volume Index       32.97       LVIDd       4.13       LVIDs       2.53       LV mass       126.23       LV Mass Index       55       LVOT diameter        2.10       LVOT peak perez       0.98       LV ESV BP       23.00       LV Systolic Volume Index       10.0       Lymph # 3.8             Lymph % 51.3             MCH 28.9             MCHC 32.9             MCV 88             Mono # 0.6             Mono % 8.0             MPV 9.7             MV valve area p 1/2 method       2.06       MV mean gradient       2       MV peak gradient       3       MV Peak A Perez       0.57       MV Peak E Perez       0.77       MV stenosis pressure 1/2 time       107.00       MV VTI       19.5       nRBC 0             Platelet Count 305             Potassium 4.1             PROTEIN TOTAL 6.9   7.3           Pulmonary Valve Mean Velocity       0.67       PV peak gradient       4       PV PEAK VELOCITY       0.98       Posterior Wall       0.95       RBC 4.92             RDW 12.6             RVDD       2.18       Sodium 138             Triscuspid Valve Regurgitation Peak Gradient       23       TR Max Perez       2.39       WBC 7.48             ZLVIDD       -7.51       ZLVIDS       -5.82               Significant Imaging: I have reviewed all pertinent imaging results/findings within the past 24 hours.    Assessment/Plan:      * Suicide attempt  With acetaminophen overdose.  Started on NAC protocol.  Continue 21 hour protocol.  Monitor Tylenol level, CMP, INR.  Patient PEC'd.  Consult Psychiatry.  Continue Haldol if needed for agitation    Vitals and labs stable   Patient is medically stable for transfer to inpatient psych facility        VTE Risk Mitigation (From admission, onward)           Ordered     IP VTE HIGH RISK PATIENT  Once         12/17/23 0102     Place sequential compression device  Until discontinued         12/17/23 0102                    Discharge Planning   OLGA:      Code Status: Full Code   Is the patient medically ready for discharge?:     Reason for patient still in hospital (select all that apply): Pending disposition  Discharge Plan A: [facility]                   Angela Brizuela MD  Department of Hospital Medicine   Pending sale to [facility]

## 2023-12-18 NOTE — ASSESSMENT & PLAN NOTE
With acetaminophen overdose.  Started on NAC protocol.  Continue 21 hour protocol.  Monitor Tylenol level, CMP, INR.  Patient PEC'd.  Consult Psychiatry.  Continue Haldol if needed for agitation    Vitals and labs stable   Patient is medically stable for transfer to inpatient psych facility

## 2023-12-18 NOTE — CARE UPDATE
12/17/23 5525   Patient Assessment/Suction   Level of Consciousness (AVPU) alert   PRE-TX-O2   Device (Oxygen Therapy) room air   SpO2 98 %   Pulse Oximetry Type Continuous   $ Pulse Oximetry - Multiple Charge Pulse Oximetry - Multiple   Pulse 84   Resp 14   Education   $ Education 15 min   Respiratory Evaluation   $ Care Plan Tech Time 15 min

## 2023-12-18 NOTE — NURSING
Patient is medically stable to be transferred out report called to [facility] amnulance came to transport personal items retrived from [facility] and given to transport Iv taken out

## 2023-12-19 NOTE — CONSULTS
Chief complaint:  Mental Health Problem (Pt arrived by ems with suicidal ideations ) and Drug Overdose (Pt states she attempted suicide by taking 30 800mg ibuprofen and 1/2 a bottle of tylenol 500mg. Pt also has superficial lacerations to her left wrist and left thigh. Pt states abdomen pain)      HPI:  Leticia Gurrola is a 23 y.o. female presenting with multiple self inflicted lacerations of the left arm and left thigh. Pt was admitted for an attempted suicide, from tylenol OD, and the lacerations were found on admission. Pt has no other complaints today.    PMH:  As per HPI and below:  No past medical history on file.    Social History     Socioeconomic History    Marital status: Single   Tobacco Use    Smoking status: Never    Smokeless tobacco: Current   Substance and Sexual Activity    Alcohol use: Not Currently     Comment: occ    Drug use: Never    Sexual activity: Not Currently   Other Topics Concern    Patient feels they ought to cut down on drinking/drug use No    Patient annoyed by others criticizing their drinking/drug use No    Patient has felt bad or guilty about drinking/drug use No    Patient has had a drink/used drugs as an eye opener in the AM No       No past surgical history on file.    No family history on file.    Review of patient's allergies indicates:  No Known Allergies    No current facility-administered medications on file prior to encounter.     Current Outpatient Medications on File Prior to Encounter   Medication Sig Dispense Refill    naproxen (NAPROSYN) 500 MG tablet Take 1 tablet (500 mg total) by mouth 2 (two) times daily with meals. 30 tablet 0    [DISCONTINUED] oxyCODONE-acetaminophen (PERCOCET) 5-325 mg per tablet Take 1 tablet by mouth every 6 (six) hours as needed for Pain. 8 tablet 0       ROS: As per HPI and below:  Pertinent items are noted in HPI.      Physical Exam:     Vitals:    12/18/23 0901 12/18/23 1001 12/18/23 1101 12/18/23 1256   BP: (!) 144/89  (!) 144/89    Pulse:   "71 83    Resp:  14 (!) 38    Temp:   98.7 °F (37.1 °C)    TempSrc:   Axillary    SpO2:  96%  96%   Weight:       Height:           BP  Min: 95/60  Max: 149/96  Temp  Av.9 °F (36.6 °C)  Min: 97.4 °F (36.3 °C)  Max: 98.8 °F (37.1 °C)  Pulse  Av.4  Min: 69  Max: 112  Resp  Av.6  Min: 12  Max: 38  SpO2  Av.5 %  Min: 96 %  Max: 100 %  Height  Av' 9.6" (176.8 cm)  Min: 5' 9" (175.3 cm)  Max: 5' 10" (177.8 cm)  Weight  Av.4 kg (247 lb 13.1 oz)  Min: 108.9 kg (240 lb)  Max: 113 kg (249 lb 1.9 oz)    Body mass index is 35.74 kg/m².          General:             Well developed, well nourished, no apparent distress  HEENT:              NCAT, no JVD, mucous membranes moist, EOM intact  Cardiovascular:  Regular rate and rhythm, normal S1, normal S2, No murmurs, rubs, or gallops  Respiratory:        Normal breath sounds, no wheezes, no rales, no rhonchi  Abdomen:           Bowel sounds present, non tender, non distended, no masses, no hepatojugular reflux  Extremities:        No clubbing, no cyanosis, no edema  Vascular:            2+ b/l radial.  Peripheral pulses intact.  No carotid bruits.  Neurological:      No focal deficits  Skin:                   No obvious rashes or erythema, multiple self inflicted lacerations of the left arm and left thigh            Lab Results   Component Value Date    WBC 7.48 2023    HGB 14.2 2023    HCT 43.1 2023    MCV 88 2023     2023     No results found for: "CHOL"  No results found for: "HDL"  No results found for: "LDLCALC"  No results found for: "TRIG"  No results found for: "CHOLHDL"  CMP  Recent Labs   Lab 23  0321   *   CALCIUM 9.4   ALBUMIN 4.1   PROT 6.9      K 4.1   CO2 22*      BUN 11   CREATININE 0.8   ALKPHOS 68   ALT 18   AST 12   BILITOT 0.3      Lab Results   Component Value Date    TSH 2.914 2023         Assessment and Recommendations       Diagnoses:    1. Multiple lacerations of " the left arm  2. Multiple lacerations of the left thigh    Plan:  1. Xeroform + mepilex to all lacerations daily    Complexity:    moderate

## 2024-02-04 NOTE — DISCHARGE SUMMARY
[facility] Medicine  Discharge Summary      Patient Name: Leticia Gurrola  MRN: [id number]  SHANDRA: [id number]  Patient Class: IP- Inpatient  Admission Date: 12/16/2023  Hospital Length of Stay: 2 days  Discharge Date and Time: 12/18/2023  Attending Physician: Keiko att. providers found   Discharging Provider: Angela Brizuela MD  Primary Care Provider: Keiko, Primary Doctor    Primary Care Team: Networked reference to record PCT     HPI:   23-year-old woman brought in for Tylenol overdose.  Patient had overdosed by Tylenol this evening for suicide attempt.  She was agitated and refusing care in the ED and given Haldol and Ativan.  She is currently very lethargic and refuses to answer questions.  She appears comfortable.  Her Tylenol level was very elevated and she was started on NAC protocol.  Pec was placed by ED physician.    * No surgery found *      Hospital Course:   Patient admitted with suicide attempt by tylenol overdose. She was PEC'd in the ED. 's office notified. Poison control gave direction on antidote. She was given acetylcysteine infusion. Acetaminophen level is in normal range. She has no acute issues and is stable for transfer to inpatient psych facility.     Patient discharging to [place] inpatient psych unit.        Yovani Carolina DO  Physician  Wound Care     Consults      Signed     Creation Time: 12/18/2023  7:24 PM     Expand All Collapse All    Chief complaint:  Mental Health Problem (Pt arrived by ems with suicidal ideations ) and Drug Overdose (Pt states she attempted suicide by taking 30 800mg ibuprofen and 1/2 a bottle of tylenol 500mg. Pt also has superficial lacerations to her left wrist and left thigh. Pt states abdomen pain)        HPI:  Leticia Gurrola is a 23 y.o. female presenting with multiple self inflicted lacerations of the left arm and left thigh. Pt was admitted for an attempted suicide, from tylenol OD, and the lacerations were found on admission. Pt  "has no other complaints today.     PMH:  As per HPI and below:  No past medical history on file.     Social History            Socioeconomic History    Marital status: Single   Tobacco Use    Smoking status: Never    Smokeless tobacco: Current   Substance and Sexual Activity    Alcohol use: Not Currently       Comment: occ    Drug use: Never    Sexual activity: Not Currently   Other Topics Concern    Patient feels they ought to cut down on drinking/drug use No    Patient annoyed by others criticizing their drinking/drug use No    Patient has felt bad or guilty about drinking/drug use No    Patient has had a drink/used drugs as an eye opener in the AM No         No past surgical history on file.     No family history on file.     Review of patient's allergies indicates:  No Known Allergies     No current facility-administered medications on file prior to encounter.             Current Outpatient Medications on File Prior to Encounter   Medication Sig Dispense Refill    naproxen (NAPROSYN) 500 MG tablet Take 1 tablet (500 mg total) by mouth 2 (two) times daily with meals. 30 tablet 0    [DISCONTINUED] oxyCODONE-acetaminophen (PERCOCET) 5-325 mg per tablet Take 1 tablet by mouth every 6 (six) hours as needed for Pain. 8 tablet 0         ROS: As per HPI and below:  Pertinent items are noted in HPI.        Physical Exam:      Vitals          Vitals:     23 0901 23 1001 23 1101 23 1256   BP: (!) 144/89   (!) 144/89     Pulse:   71 83     Resp:   14 (!) 38     Temp:     98.7 °F (37.1 °C)     TempSrc:     Axillary     SpO2:   96%   96%   Weight:           Height:                    BP  Min: 95/60  Max: 149/96  Temp  Av.9 °F (36.6 °C)  Min: 97.4 °F (36.3 °C)  Max: 98.8 °F (37.1 °C)  Pulse  Av.4  Min: 69  Max: 112  Resp  Av.6  Min: 12  Max: 38  SpO2  Av.5 %  Min: 96 %  Max: 100 %  Height  Av' 9.6" (176.8 cm)  Min: 5' 9" (175.3 cm)  Max: 5' 10" (177.8 cm)  Weight  Av.4 kg " "(247 lb 13.1 oz)  Min: 108.9 kg (240 lb)  Max: 113 kg (249 lb 1.9 oz)     Body mass index is 35.74 kg/m².              General:             Well developed, well nourished, no apparent distress  HEENT:              NCAT, no JVD, mucous membranes moist, EOM intact  Cardiovascular:  Regular rate and rhythm, normal S1, normal S2, No murmurs, rubs, or gallops  Respiratory:        Normal breath sounds, no wheezes, no rales, no rhonchi  Abdomen:           Bowel sounds present, non tender, non distended, no masses, no hepatojugular reflux  Extremities:        No clubbing, no cyanosis, no edema  Vascular:            2+ b/l radial.  Peripheral pulses intact.  No carotid bruits.  Neurological:      No focal deficits  Skin:                   No obvious rashes or erythema, multiple self inflicted lacerations of the left arm and left thigh                     Lab Results   Component Value Date     WBC 7.48 12/18/2023     HGB 14.2 12/18/2023     HCT 43.1 12/18/2023     MCV 88 12/18/2023      12/18/2023      No results found for: "CHOL"  No results found for: "HDL"  No results found for: "LDLCALC"  No results found for: "TRIG"  No results found for: "CHOLHDL"  CMP      Recent Labs   Lab 12/18/23  0321   *   CALCIUM 9.4   ALBUMIN 4.1   PROT 6.9      K 4.1   CO2 22*      BUN 11   CREATININE 0.8   ALKPHOS 68   ALT 18   AST 12   BILITOT 0.3                       Lab Results   Component Value Date     TSH 2.914 12/16/2023            Assessment and Recommendations         Diagnoses:    1. Multiple lacerations of the left arm  2. Multiple lacerations of the left thigh     Plan:  1. Xeroform + mepilex to all lacerations daily     Complexity:     moderate                 Goals of Care Treatment Preferences:  Code Status: Full Code      Consults:   Consults (From admission, onward)          Status Ordering Provider     Inpatient consult to Cardiology  Once        Provider:  Song Vila MD    Completed ARDON, " ORVILLE MADRIGAL            No new Assessment & Plan notes have been filed under this hospital service since the last note was generated.  Service: Hospital Medicine    Final Active Diagnoses:    Diagnosis Date Noted POA    PRINCIPAL PROBLEM:  Suicide attempt [T14.91XA] 12/17/2023 Yes      Problems Resolved During this Admission:       Discharged Condition: stable    Disposition: Psychiatric Hospital    Follow Up:   Follow-up Information       Unit, [facility] Psych Follow up.    Specialties: Psychiatry, Behavioral Health, Psychiatric Facility  Contact information:  [facility] [place] [address] [phone]                           Patient Instructions:      Diet Adult Regular     Activity as tolerated       Significant Diagnostic Studies: N/A    Pending Diagnostic Studies:       None           Medications:  Reconciled Home Medications:      Medication List        STOP taking these medications      oxyCODONE-acetaminophen 5-325 mg per tablet  Commonly known as: PERCOCET              Indwelling Lines/Drains at time of discharge:   Lines/Drains/Airways       None                   Time spent on the discharge of patient: 35 minutes         Angela Brizuela MD  Department of Hospital Medicine  [facility]

## 2024-06-07 ENCOUNTER — HOSPITAL ENCOUNTER (EMERGENCY)
Facility: HOSPITAL | Age: 24
Discharge: HOME | End: 2024-06-07
Attending: EMERGENCY MEDICINE

## 2024-06-07 VITALS
HEIGHT: 70 IN | SYSTOLIC BLOOD PRESSURE: 133 MMHG | OXYGEN SATURATION: 99 % | HEART RATE: 80 BPM | DIASTOLIC BLOOD PRESSURE: 94 MMHG | BODY MASS INDEX: 34.36 KG/M2 | RESPIRATION RATE: 18 BRPM | TEMPERATURE: 98 F | WEIGHT: 240 LBS

## 2024-06-07 DIAGNOSIS — K08.89 TOOTHACHE: Primary | ICD-10-CM

## 2024-06-07 LAB
B-HCG UR QL: NEGATIVE
CTP QC/QA: YES

## 2024-06-07 PROCEDURE — 25000003 PHARM REV CODE 250: Performed by: NURSE PRACTITIONER

## 2024-06-07 PROCEDURE — 99283 EMERGENCY DEPT VISIT LOW MDM: CPT

## 2024-06-07 PROCEDURE — 81025 URINE PREGNANCY TEST: CPT | Performed by: NURSE PRACTITIONER

## 2024-06-07 RX ORDER — OXYCODONE AND ACETAMINOPHEN 7.5; 325 MG/1; MG/1
1 TABLET ORAL
Status: COMPLETED | OUTPATIENT
Start: 2024-06-07 | End: 2024-06-07

## 2024-06-07 RX ADMIN — OXYCODONE HYDROCHLORIDE AND ACETAMINOPHEN 1 TABLET: 7.5; 325 TABLET ORAL at 09:06

## 2024-06-07 NOTE — ED PROVIDER NOTES
Encounter Date: 6/7/2024       History     Chief Complaint   Patient presents with    Dental Pain     States has dental pain seen at urgent care yesterday given antibiotics and pain medication states not working     Presents with complaint of impacted wisdom tooth.  This has on the left lower bottom.  Patient reports pain.  She was seen by urgent care yesterday and given clindamycin t.i.d. along with Toradol for pain.  She reports a Toradol is not helping her.  She has not called a dentist as she reports she can not afford to.  She denies shortness of breath or difficulty swallowing.      Review of patient's allergies indicates:  No Known Allergies  History reviewed. No pertinent past medical history.  History reviewed. No pertinent surgical history.  No family history on file.  Social History     Tobacco Use    Smoking status: Never    Smokeless tobacco: Current   Substance Use Topics    Alcohol use: Not Currently     Comment: occ    Drug use: Never     Review of Systems   Constitutional:  Negative for fever.   HENT:  Positive for dental problem. Negative for drooling, ear pain, facial swelling, trouble swallowing and voice change.    Respiratory:  Negative for cough, shortness of breath and wheezing.    Cardiovascular:  Negative for chest pain, palpitations and leg swelling.   Gastrointestinal:  Negative for abdominal pain, diarrhea, nausea and vomiting.   Genitourinary:  Negative for dysuria.   Musculoskeletal:  Negative for back pain.   Skin:  Negative for rash.   Neurological:  Negative for weakness.       Physical Exam     Initial Vitals [06/07/24 0845]   BP Pulse Resp Temp SpO2   (!) 133/94 80 18 98.1 °F (36.7 °C) 99 %      MAP       --         Physical Exam    Constitutional: She appears well-developed and well-nourished.   HENT:   Head: Normocephalic.   Mouth/Throat: Oropharynx is clear and moist.   There has an impacted wisdom tooth on the right lower jaw.  There is swelling.  Airway is patent.   Eyes:  Conjunctivae are normal.   Neck: Neck supple.   Normal range of motion.  Cardiovascular:  Normal rate, regular rhythm and normal heart sounds.           Pulmonary/Chest: Breath sounds normal. No respiratory distress.   Musculoskeletal:         General: Normal range of motion.      Cervical back: Normal range of motion and neck supple.      Comments: Patient is ambulatory per self.  Her gait is steady.     Neurological: She is alert and oriented to person, place, and time. No sensory deficit. GCS score is 15. GCS eye subscore is 4. GCS verbal subscore is 5. GCS motor subscore is 6.   Skin: Skin is warm and dry. Capillary refill takes less than 2 seconds.   Psychiatric: She has a normal mood and affect. Thought content normal.         ED Course   Procedures  Labs Reviewed   POCT URINE PREGNANCY          Imaging Results    None          Medications   oxyCODONE-acetaminophen 7.5-325 mg per tablet 1 tablet (1 tablet Oral Given 6/7/24 0901)     Medical Decision Making  Presents with dental pain.  Patient was seen by urgent care yesterday.  She was given Toradol for pain.  She was also given clindamycin to take 3 times a day.  She reports the Toradol is not helping the pain.  She has not called a dentist as she reports she can not afford to.    Amount and/or Complexity of Data Reviewed  Labs: ordered.  Discussion of management or test interpretation with external provider(s): Patient was given Percocet 7.5 mg here in the ED. I have explained to her that this will get her pain under control and she can keep it under control with the Toradol.  She has been instructed take the Toradol with food.  I have given her the phone number to [facility].  I have instructed her to call them and to take whatever appointment they gave her.  She was given strict return precautions.    Risk  Prescription drug management.                                      Clinical Impression:  Final diagnoses:  [K08.89] Toothache (Primary)           ED Disposition Condition    Discharge Stable          ED Prescriptions    None       Follow-up Information    None          Adriana Luque NP  06/07/24 3556

## 2024-06-07 NOTE — DISCHARGE INSTRUCTIONS
Up appoint with [facility].  I have given yo  Take the medication you were given at urgent care as directed.  Take this medication with food.  [facility] [address]  Phone number [phone]

## 2024-06-07 NOTE — PLAN OF CARE
06/07/24 1145   Discharge Reassessment   Assessment Type Discharge Planning Reassessment   Did the patient's condition or plan change since previous assessment? Yes   Post-Acute Status   Discharge Delays None known at this time     Pt is scheduled with Dentist miguel Erickson today for 1:15 pm.

## 2025-08-01 ENCOUNTER — OCCUPATIONAL HEALTH (OUTPATIENT)
Dept: URGENT CARE | Facility: CLINIC | Age: 25
End: 2025-08-01